# Patient Record
Sex: FEMALE | Race: WHITE | URBAN - METROPOLITAN AREA
[De-identification: names, ages, dates, MRNs, and addresses within clinical notes are randomized per-mention and may not be internally consistent; named-entity substitution may affect disease eponyms.]

---

## 2018-11-09 ENCOUNTER — TRANSFERRED RECORDS (OUTPATIENT)
Dept: HEALTH INFORMATION MANAGEMENT | Facility: CLINIC | Age: 43
End: 2018-11-09
Payer: OTHER MISCELLANEOUS

## 2019-03-13 RX ORDER — ZOLPIDEM TARTRATE 5 MG/1
5-10 TABLET ORAL
COMMUNITY

## 2019-03-13 RX ORDER — VENLAFAXINE HYDROCHLORIDE 150 MG/1
150 CAPSULE, EXTENDED RELEASE ORAL DAILY
COMMUNITY
End: 2020-11-18

## 2019-03-13 RX ORDER — LISINOPRIL AND HYDROCHLOROTHIAZIDE 20; 25 MG/1; MG/1
1 TABLET ORAL DAILY
COMMUNITY

## 2019-03-18 ASSESSMENT — MIFFLIN-ST. JEOR: SCORE: 1423.79

## 2019-03-19 ENCOUNTER — ANESTHESIA EVENT (OUTPATIENT)
Dept: SURGERY | Facility: AMBULATORY SURGERY CENTER | Age: 44
End: 2019-03-19

## 2019-03-19 NOTE — ANESTHESIA PREPROCEDURE EVALUATION
"Anesthesia Pre-Procedure Evaluation    Patient: Maira Kan   MRN:     [id number] Gender:   female   Age:    43 year old :      1975        Preoperative Diagnosis: Cosmetic   Procedure(s):  REMOVE IMPLANT BREAST  REPLACE SILICONE IMPLANTS, WISE PATTERN BREAST LIFT  LIPOSUCTION SUBAXILLARY     No past medical history on file.   No past surgical history on file.                PHYSICAL EXAM:   Mental Status/Neuro: A/A/O   Airway: Facies: Feasible  Mallampati: II  Mouth/Opening: Full  TM distance: > 6 cm  Neck ROM: Full   Respiratory: Auscultation: CTAB     Resp. Rate: Normal     Resp. Effort: Normal      CV: Rhythm: Regular  Rate: Age appropriate  Heart: Normal Sounds   Comments:      Dental: Normal                  No results found for: WBC, HGB, HCT, PLT, CRP, SED, NA, POTASSIUM, CHLORIDE, CO2, BUN, CR, GLC, ANNY, PHOS, MAG, ALBUMIN, PROTTOTAL, ALT, AST, GGT, ALKPHOS, BILITOTAL, BILIDIRECT, LIPASE, AMYLASE, ROSALINA, PTT, INR, FIBR, TSH, T4, T3, HCG, HCGS, CKTOTAL, CKMB, TROPN    Preop Vitals  BP Readings from Last 3 Encounters:   No data found for BP    Pulse Readings from Last 3 Encounters:   No data found for Pulse      Resp Readings from Last 3 Encounters:   No data found for Resp    SpO2 Readings from Last 3 Encounters:   No data found for SpO2      Temp Readings from Last 1 Encounters:   No data found for Temp    Ht Readings from Last 1 Encounters:   19 1.626 m (5' 4\")      Wt Readings from Last 1 Encounters:   19 79.4 kg (175 lb)    Estimated body mass index is 30.04 kg/m  as calculated from the following:    Height as of this encounter: 1.626 m (5' 4\").    Weight as of this encounter: 79.4 kg (175 lb).     LDA:            Assessment:   ASA SCORE: 2    NPO Status: > 2 hours since completed Clear Liquids; > 6 hours since completed Solid Foods   Documentation: H&P complete; Preop Testing complete; Consents complete   Proceeding: Proceed without further delay  Tobacco Use:  NO Active use of " Tobacco/UNKNOWN Tobacco use status     Plan:   Anes. Type:  General   Pre-Induction: Midazolam IV; Acetaminophen PO   Induction:  IV (Standard)   Airway: Oral ETT   Access/Monitoring: PIV   Maintenance: Balanced   Emergence: Procedure Site   Logistics: Same Day Surgery     Postop Pain/Sedation Strategy:  Standard-Options: Opioids PRN; IV Ketorolac; LA by Surgeon     PONV Management:  Adult Risk Factors: Female, Non-Smoker, Postop Opioids  Prevention: Ondansetron; Dexamethasone     CONSENT: Direct conversation   Plan and risks discussed with: Patient                        ANESTHESIA PREOP EVALUATION    PROCEDURE: Procedure(s):  REMOVE IMPLANT BREAST  REPLACE SILICONE IMPLANTS, WISE PATTERN BREAST LIFT  LIPOSUCTION SUBAXILLARY    HPI: Maira Kan is a 43 year old female who presents for above procedure 2/2 cosmetic.    PAST MEDICAL HISTORY:    No past medical history on file.    PAST SURGICAL HISTORY:    No past surgical history on file.    PAST ANESTHESIA HISTORY:     No personal or family h/o anesthesia problems    SOCIAL HISTORY:       Social History     Tobacco Use     Smoking status: Not on file   Substance Use Topics     Alcohol use: Not on file       ALLERGIES:     No Known Allergies    MEDICATIONS:       (Not in a hospital admission)    Current Outpatient Medications   Medication Sig Dispense Refill     buPROPion (WELLBUTRIN) 75 MG tablet Take 75 mg by mouth daily       lisinopril-hydrochlorothiazide (PRINZIDE/ZESTORETIC) 20-12.5 MG tablet Take 1 tablet by mouth daily       venlafaxine (EFFEXOR-XR) 150 MG 24 hr capsule Take 150 mg by mouth daily       zolpidem (AMBIEN) 5 MG tablet Take 5-10 mg by mouth nightly as needed for sleep         Current Outpatient Medications Ordered in Epic   Medication Sig Dispense Refill     buPROPion (WELLBUTRIN) 75 MG tablet Take 75 mg by mouth daily       lisinopril-hydrochlorothiazide (PRINZIDE/ZESTORETIC) 20-12.5 MG tablet Take 1 tablet by mouth daily       venlafaxine  (EFFEXOR-XR) 150 MG 24 hr capsule Take 150 mg by mouth daily       zolpidem (AMBIEN) 5 MG tablet Take 5-10 mg by mouth nightly as needed for sleep       No current Epic-ordered facility-administered medications on file.        PHYSICAL EXAM:    Vitals: T Data Unavailable, P Data Unavailable, BP Data Unavailable, R Data Unavailable, SpO2  , Weight Wt Readings from Last 2 Encounters:   03/18/19 79.4 kg (175 lb)     See doc flowsheet    NPO STATUS: see doc flowsheet    LABS:    BMP:  No results for input(s): NA, POTASSIUM, CHLORIDE, CO2, BUN, CR, GLC, ANNY in the last 19099 hours.    LFTs:   No results for input(s): PROTTOTAL, ALBUMIN, BILITOTAL, ALKPHOS, AST, ALT, BILIDIRECT in the last 35680 hours.    CBC:   No results for input(s): WBC, RBC, HGB, HCT, MCV, MCH, MCHC, RDW, PLT in the last 98814 hours.    Coags:  No results for input(s): INR, PTT, FIBR in the last 31644 hours.    Imaging:  No orders to display       Yuriy Clemons MD  Anesthesiology Staff  Pager [phone]    3/19/2019  1:24 PM        Yuriy Clemons MD

## 2019-03-20 ENCOUNTER — HOSPITAL ENCOUNTER (OUTPATIENT)
Facility: AMBULATORY SURGERY CENTER | Age: 44
Discharge: HOME | End: 2019-03-20
Attending: PLASTIC SURGERY | Admitting: PLASTIC SURGERY

## 2019-03-20 ENCOUNTER — SURGERY (OUTPATIENT)
Age: 44
End: 2019-03-20
Payer: OTHER MISCELLANEOUS

## 2019-03-20 ENCOUNTER — ANESTHESIA (OUTPATIENT)
Dept: SURGERY | Facility: AMBULATORY SURGERY CENTER | Age: 44
End: 2019-03-20
Payer: OTHER MISCELLANEOUS

## 2019-03-20 VITALS
HEIGHT: 64 IN | RESPIRATION RATE: 14 BRPM | WEIGHT: 175 LBS | BODY MASS INDEX: 29.88 KG/M2 | SYSTOLIC BLOOD PRESSURE: 100 MMHG | OXYGEN SATURATION: 97 % | DIASTOLIC BLOOD PRESSURE: 58 MMHG | HEART RATE: 111 BPM | TEMPERATURE: 98 F

## 2019-03-20 DIAGNOSIS — R52 PAIN: Primary | ICD-10-CM

## 2019-03-20 DIAGNOSIS — R11.0 NAUSEA AFTER ANESTHESIA, INITIAL ENCOUNTER: ICD-10-CM

## 2019-03-20 DIAGNOSIS — T88.59XA NAUSEA AFTER ANESTHESIA, INITIAL ENCOUNTER: ICD-10-CM

## 2019-03-20 DIAGNOSIS — B99.9 INFECTION: ICD-10-CM

## 2019-03-20 PROCEDURE — 88305 TISSUE EXAM BY PATHOLOGIST: CPT | Performed by: PLASTIC SURGERY

## 2019-03-20 PROCEDURE — 36000145

## 2019-03-20 PROCEDURE — 36000144 ZZH SURGERY LEVEL COSMETIC 240 MIN

## 2019-03-20 PROCEDURE — G8907 PT DOC NO EVENTS ON DISCHARG: HCPCS

## 2019-03-20 PROCEDURE — G8916 PT W IV AB GIVEN ON TIME: HCPCS

## 2019-03-20 PROCEDURE — L8600 IMPLANT BREAST SILICONE/EQ: HCPCS

## 2019-03-20 DEVICE — IMPLANTABLE DEVICE: Type: IMPLANTABLE DEVICE | Site: BREAST | Status: FUNCTIONAL

## 2019-03-20 RX ORDER — ONDANSETRON 4 MG/1
8 TABLET, ORALLY DISINTEGRATING ORAL EVERY 8 HOURS PRN
Qty: 4 TABLET | Refills: 0
Start: 2019-03-20 | End: 2020-11-18

## 2019-03-20 RX ORDER — LABETALOL HYDROCHLORIDE 5 MG/ML
INJECTION, SOLUTION INTRAVENOUS PRN
Status: DISCONTINUED | OUTPATIENT
Start: 2019-03-20 | End: 2019-03-20

## 2019-03-20 RX ORDER — CEFAZOLIN SODIUM 2 G/100ML
2 INJECTION, SOLUTION INTRAVENOUS
Status: COMPLETED | OUTPATIENT
Start: 2019-03-20 | End: 2019-03-20

## 2019-03-20 RX ORDER — SODIUM CHLORIDE, SODIUM LACTATE, POTASSIUM CHLORIDE, CALCIUM CHLORIDE 600; 310; 30; 20 MG/100ML; MG/100ML; MG/100ML; MG/100ML
INJECTION, SOLUTION INTRAVENOUS CONTINUOUS
Status: DISCONTINUED | OUTPATIENT
Start: 2019-03-20 | End: 2019-03-21 | Stop reason: HOSPADM

## 2019-03-20 RX ORDER — OXYCODONE AND ACETAMINOPHEN 5; 325 MG/1; MG/1
1-2 TABLET ORAL EVERY 4 HOURS PRN
Qty: 20 TABLET | Refills: 0
Start: 2019-03-20 | End: 2020-11-18

## 2019-03-20 RX ORDER — CEFAZOLIN SODIUM 1 G/3ML
1 INJECTION, POWDER, FOR SOLUTION INTRAMUSCULAR; INTRAVENOUS SEE ADMIN INSTRUCTIONS
Status: DISCONTINUED | OUTPATIENT
Start: 2019-03-20 | End: 2019-03-21 | Stop reason: HOSPADM

## 2019-03-20 RX ORDER — FENTANYL CITRATE 50 UG/ML
25-50 INJECTION, SOLUTION INTRAMUSCULAR; INTRAVENOUS
Status: DISCONTINUED | OUTPATIENT
Start: 2019-03-20 | End: 2019-03-21 | Stop reason: HOSPADM

## 2019-03-20 RX ORDER — MEPERIDINE HYDROCHLORIDE 25 MG/ML
12.5 INJECTION INTRAMUSCULAR; INTRAVENOUS; SUBCUTANEOUS
Status: DISCONTINUED | OUTPATIENT
Start: 2019-03-20 | End: 2019-03-21 | Stop reason: HOSPADM

## 2019-03-20 RX ORDER — GLYCOPYRROLATE 0.2 MG/ML
INJECTION, SOLUTION INTRAMUSCULAR; INTRAVENOUS PRN
Status: DISCONTINUED | OUTPATIENT
Start: 2019-03-20 | End: 2019-03-20

## 2019-03-20 RX ORDER — ONDANSETRON 2 MG/ML
4 INJECTION INTRAMUSCULAR; INTRAVENOUS EVERY 30 MIN PRN
Status: DISCONTINUED | OUTPATIENT
Start: 2019-03-20 | End: 2019-03-21 | Stop reason: HOSPADM

## 2019-03-20 RX ORDER — LIDOCAINE HYDROCHLORIDE 20 MG/ML
INJECTION, SOLUTION INFILTRATION; PERINEURAL PRN
Status: DISCONTINUED | OUTPATIENT
Start: 2019-03-20 | End: 2019-03-20

## 2019-03-20 RX ORDER — CEPHALEXIN 500 MG/1
500 CAPSULE ORAL 2 TIMES DAILY
Qty: 28 CAPSULE | Refills: 0
Start: 2019-03-20 | End: 2019-03-27

## 2019-03-20 RX ORDER — SCOLOPAMINE TRANSDERMAL SYSTEM 1 MG/1
1 PATCH, EXTENDED RELEASE TRANSDERMAL
Status: COMPLETED | OUTPATIENT
Start: 2019-03-20 | End: 2019-03-20

## 2019-03-20 RX ORDER — ONDANSETRON 4 MG/1
4 TABLET, ORALLY DISINTEGRATING ORAL
Status: DISCONTINUED | OUTPATIENT
Start: 2019-03-20 | End: 2019-03-21 | Stop reason: HOSPADM

## 2019-03-20 RX ORDER — ONDANSETRON 4 MG/1
4 TABLET, ORALLY DISINTEGRATING ORAL EVERY 30 MIN PRN
Status: DISCONTINUED | OUTPATIENT
Start: 2019-03-20 | End: 2019-03-21 | Stop reason: HOSPADM

## 2019-03-20 RX ORDER — OXYCODONE AND ACETAMINOPHEN 5; 325 MG/1; MG/1
2 TABLET ORAL
Status: DISCONTINUED | OUTPATIENT
Start: 2019-03-20 | End: 2019-03-21 | Stop reason: HOSPADM

## 2019-03-20 RX ORDER — ALBUTEROL SULFATE 0.83 MG/ML
2.5 SOLUTION RESPIRATORY (INHALATION) EVERY 4 HOURS PRN
Status: DISCONTINUED | OUTPATIENT
Start: 2019-03-20 | End: 2019-03-21 | Stop reason: HOSPADM

## 2019-03-20 RX ORDER — BUPIVACAINE HYDROCHLORIDE AND EPINEPHRINE 2.5; 5 MG/ML; UG/ML
INJECTION, SOLUTION INFILTRATION; PERINEURAL PRN
Status: DISCONTINUED | OUTPATIENT
Start: 2019-03-20 | End: 2019-03-20 | Stop reason: HOSPADM

## 2019-03-20 RX ORDER — DIAZEPAM 10 MG
10 TABLET ORAL EVERY 12 HOURS PRN
Status: DISCONTINUED
Start: 2019-03-20 | End: 2019-03-21 | Stop reason: HOSPADM

## 2019-03-20 RX ORDER — DEXAMETHASONE SODIUM PHOSPHATE 4 MG/ML
4 INJECTION, SOLUTION INTRA-ARTICULAR; INTRALESIONAL; INTRAMUSCULAR; INTRAVENOUS; SOFT TISSUE EVERY 10 MIN PRN
Status: DISCONTINUED | OUTPATIENT
Start: 2019-03-20 | End: 2019-03-21 | Stop reason: HOSPADM

## 2019-03-20 RX ORDER — LIDOCAINE 40 MG/G
CREAM TOPICAL
Status: DISCONTINUED | OUTPATIENT
Start: 2019-03-20 | End: 2019-03-21 | Stop reason: HOSPADM

## 2019-03-20 RX ORDER — NEOSTIGMINE METHYLSULFATE 1 MG/ML
VIAL (ML) INJECTION PRN
Status: DISCONTINUED | OUTPATIENT
Start: 2019-03-20 | End: 2019-03-20

## 2019-03-20 RX ORDER — HYDROXYZINE HYDROCHLORIDE 25 MG/1
25 TABLET, FILM COATED ORAL
Status: DISCONTINUED | OUTPATIENT
Start: 2019-03-20 | End: 2019-03-21 | Stop reason: HOSPADM

## 2019-03-20 RX ORDER — ACETAMINOPHEN 325 MG/1
975 TABLET ORAL ONCE
Status: COMPLETED | OUTPATIENT
Start: 2019-03-20 | End: 2019-03-20

## 2019-03-20 RX ORDER — ONDANSETRON 2 MG/ML
INJECTION INTRAMUSCULAR; INTRAVENOUS PRN
Status: DISCONTINUED | OUTPATIENT
Start: 2019-03-20 | End: 2019-03-20

## 2019-03-20 RX ORDER — PROPOFOL 10 MG/ML
INJECTION, EMULSION INTRAVENOUS PRN
Status: DISCONTINUED | OUTPATIENT
Start: 2019-03-20 | End: 2019-03-20

## 2019-03-20 RX ORDER — OXYCODONE HYDROCHLORIDE 5 MG/1
5-10 TABLET ORAL EVERY 4 HOURS PRN
Status: DISCONTINUED | OUTPATIENT
Start: 2019-03-20 | End: 2019-03-21 | Stop reason: HOSPADM

## 2019-03-20 RX ORDER — HYDROMORPHONE HYDROCHLORIDE 1 MG/ML
.3-.5 INJECTION, SOLUTION INTRAMUSCULAR; INTRAVENOUS; SUBCUTANEOUS EVERY 10 MIN PRN
Status: DISCONTINUED | OUTPATIENT
Start: 2019-03-20 | End: 2019-03-21 | Stop reason: HOSPADM

## 2019-03-20 RX ORDER — KETOROLAC TROMETHAMINE 30 MG/ML
30 INJECTION, SOLUTION INTRAMUSCULAR; INTRAVENOUS EVERY 6 HOURS PRN
Status: DISCONTINUED | OUTPATIENT
Start: 2019-03-20 | End: 2019-03-21 | Stop reason: HOSPADM

## 2019-03-20 RX ORDER — NALOXONE HYDROCHLORIDE 0.4 MG/ML
.1-.4 INJECTION, SOLUTION INTRAMUSCULAR; INTRAVENOUS; SUBCUTANEOUS
Status: DISCONTINUED | OUTPATIENT
Start: 2019-03-20 | End: 2019-03-21 | Stop reason: HOSPADM

## 2019-03-20 RX ORDER — GABAPENTIN 300 MG/1
300 CAPSULE ORAL ONCE
Status: COMPLETED | OUTPATIENT
Start: 2019-03-20 | End: 2019-03-20

## 2019-03-20 RX ORDER — DEXAMETHASONE SODIUM PHOSPHATE 4 MG/ML
10 INJECTION, SOLUTION INTRA-ARTICULAR; INTRALESIONAL; INTRAMUSCULAR; INTRAVENOUS; SOFT TISSUE ONCE
Status: COMPLETED | OUTPATIENT
Start: 2019-03-20 | End: 2019-03-20

## 2019-03-20 RX ORDER — FENTANYL CITRATE 50 UG/ML
INJECTION, SOLUTION INTRAMUSCULAR; INTRAVENOUS PRN
Status: DISCONTINUED | OUTPATIENT
Start: 2019-03-20 | End: 2019-03-20

## 2019-03-20 RX ORDER — HYDROXYZINE PAMOATE 25 MG/1
25 CAPSULE ORAL EVERY 6 HOURS PRN
Qty: 30 CAPSULE | Refills: 0
Start: 2019-03-20 | End: 2020-11-18

## 2019-03-20 RX ORDER — PROPOFOL 10 MG/ML
INJECTION, EMULSION INTRAVENOUS CONTINUOUS PRN
Status: DISCONTINUED | OUTPATIENT
Start: 2019-03-20 | End: 2019-03-20

## 2019-03-20 RX ORDER — TRIAMCINOLONE ACETONIDE 40 MG/ML
INJECTION, SUSPENSION INTRA-ARTICULAR; INTRAMUSCULAR PRN
Status: DISCONTINUED | OUTPATIENT
Start: 2019-03-20 | End: 2019-03-20 | Stop reason: HOSPADM

## 2019-03-20 RX ADMIN — TRIAMCINOLONE ACETONIDE 40 MG: 40 INJECTION, SUSPENSION INTRA-ARTICULAR; INTRAMUSCULAR at 11:35

## 2019-03-20 RX ADMIN — SODIUM CHLORIDE, SODIUM LACTATE, POTASSIUM CHLORIDE, CALCIUM CHLORIDE: 600; 310; 30; 20 INJECTION, SOLUTION INTRAVENOUS at 09:54

## 2019-03-20 RX ADMIN — ACETAMINOPHEN 975 MG: 325 TABLET ORAL at 06:48

## 2019-03-20 RX ADMIN — GLYCOPYRROLATE 0.3 MG: 0.2 INJECTION, SOLUTION INTRAMUSCULAR; INTRAVENOUS at 12:02

## 2019-03-20 RX ADMIN — ONDANSETRON 4 MG: 2 INJECTION INTRAMUSCULAR; INTRAVENOUS at 11:53

## 2019-03-20 RX ADMIN — DEXAMETHASONE SODIUM PHOSPHATE 10 MG: 4 INJECTION, SOLUTION INTRA-ARTICULAR; INTRALESIONAL; INTRAMUSCULAR; INTRAVENOUS; SOFT TISSUE at 08:25

## 2019-03-20 RX ADMIN — Medication 20 MG: at 08:20

## 2019-03-20 RX ADMIN — SODIUM CHLORIDE, SODIUM LACTATE, POTASSIUM CHLORIDE, CALCIUM CHLORIDE: 600; 310; 30; 20 INJECTION, SOLUTION INTRAVENOUS at 08:16

## 2019-03-20 RX ADMIN — FENTANYL CITRATE 50 MCG: 50 INJECTION, SOLUTION INTRAMUSCULAR; INTRAVENOUS at 12:58

## 2019-03-20 RX ADMIN — PROPOFOL 200 MG: 10 INJECTION, EMULSION INTRAVENOUS at 08:20

## 2019-03-20 RX ADMIN — FENTANYL CITRATE 100 MCG: 50 INJECTION, SOLUTION INTRAMUSCULAR; INTRAVENOUS at 08:20

## 2019-03-20 RX ADMIN — Medication 20 MG: at 08:40

## 2019-03-20 RX ADMIN — Medication 20 MG: at 10:09

## 2019-03-20 RX ADMIN — GABAPENTIN 300 MG: 300 CAPSULE ORAL at 06:48

## 2019-03-20 RX ADMIN — OXYCODONE HYDROCHLORIDE 10 MG: 5 TABLET ORAL at 12:41

## 2019-03-20 RX ADMIN — LIDOCAINE HYDROCHLORIDE 60 MG: 20 INJECTION, SOLUTION INFILTRATION; PERINEURAL at 08:20

## 2019-03-20 RX ADMIN — CEFAZOLIN SODIUM 1 G: 2 INJECTION, SOLUTION INTRAVENOUS at 10:28

## 2019-03-20 RX ADMIN — FENTANYL CITRATE 25 MCG: 50 INJECTION, SOLUTION INTRAMUSCULAR; INTRAVENOUS at 11:54

## 2019-03-20 RX ADMIN — Medication 20 MG: at 09:00

## 2019-03-20 RX ADMIN — FENTANYL CITRATE 25 MCG: 50 INJECTION, SOLUTION INTRAMUSCULAR; INTRAVENOUS at 10:17

## 2019-03-20 RX ADMIN — FENTANYL CITRATE 25 MCG: 50 INJECTION, SOLUTION INTRAMUSCULAR; INTRAVENOUS at 09:28

## 2019-03-20 RX ADMIN — SCOLOPAMINE TRANSDERMAL SYSTEM 1 PATCH: 1 PATCH, EXTENDED RELEASE TRANSDERMAL at 07:10

## 2019-03-20 RX ADMIN — CEFAZOLIN SODIUM 2 G: 2 INJECTION, SOLUTION INTRAVENOUS at 08:28

## 2019-03-20 RX ADMIN — Medication 10 MG: at 09:29

## 2019-03-20 RX ADMIN — HYDROMORPHONE HYDROCHLORIDE 0.5 MG: 1 INJECTION, SOLUTION INTRAMUSCULAR; INTRAVENOUS; SUBCUTANEOUS at 13:18

## 2019-03-20 RX ADMIN — FENTANYL CITRATE 50 MCG: 50 INJECTION, SOLUTION INTRAMUSCULAR; INTRAVENOUS at 12:50

## 2019-03-20 RX ADMIN — BUPIVACAINE HYDROCHLORIDE AND EPINEPHRINE 20 ML: 2.5; 5 INJECTION, SOLUTION INFILTRATION; PERINEURAL at 11:32

## 2019-03-20 RX ADMIN — Medication 3 MG: at 12:02

## 2019-03-20 RX ADMIN — SODIUM CHLORIDE, SODIUM LACTATE, POTASSIUM CHLORIDE, CALCIUM CHLORIDE: 600; 310; 30; 20 INJECTION, SOLUTION INTRAVENOUS at 07:06

## 2019-03-20 RX ADMIN — BUPIVACAINE HYDROCHLORIDE AND EPINEPHRINE 30 ML: 2.5; 5 INJECTION, SOLUTION INFILTRATION; PERINEURAL at 12:24

## 2019-03-20 RX ADMIN — LABETALOL HYDROCHLORIDE 10 MG: 5 INJECTION, SOLUTION INTRAVENOUS at 08:28

## 2019-03-20 RX ADMIN — SODIUM CHLORIDE, SODIUM LACTATE, POTASSIUM CHLORIDE, CALCIUM CHLORIDE: 600; 310; 30; 20 INJECTION, SOLUTION INTRAVENOUS at 11:22

## 2019-03-20 RX ADMIN — HYDROMORPHONE HYDROCHLORIDE 0.5 MG: 1 INJECTION, SOLUTION INTRAMUSCULAR; INTRAVENOUS; SUBCUTANEOUS at 13:34

## 2019-03-20 RX ADMIN — PROPOFOL 175 MCG/KG/MIN: 10 INJECTION, EMULSION INTRAVENOUS at 08:20

## 2019-03-20 NOTE — ANESTHESIA CARE TRANSFER NOTE
Patient: Maira Kan    Procedure(s):  BILATERAL REMOVE IMPLANT BREAST  BILATERAL REPLACE SILICONE IMPLANTS, WISE PATTERN BREAST LIFT  BILATERAL LIPOSUCTION SUBAXILLARY    Diagnosis: Cosmetic  Diagnosis Additional Information: No value filed.    Anesthesia Type:   General     Note:  Airway :Nasal Cannula  Patient transferred to:PACU  Comments: Awake, comfortable, sats 99%, Report to RN.Handoff Report: Identifed the Patient, Identified the Reponsible Provider, Reviewed the pertinent medical history, Discussed the surgical course, Reviewed Intra-OP anesthesia mangement and issues during anesthesia, Set expectations for post-procedure period and Allowed opportunity for questions and acknowledgement of understanding      Vitals: (Last set prior to Anesthesia Care Transfer)    CRNA VITALS  3/20/2019 1159 - 3/20/2019 1236      3/20/2019             Resp Rate (observed):  7  (Abnormal)                 Electronically Signed By: AGUSTIN Chery CRNA  March 20, 2019  12:36 PM

## 2019-03-20 NOTE — BRIEF OP NOTE
[facility] Brief Operative Note    Pre-operative diagnosis: Ruptured right breast implant; breast ptosis; localized fat accumulation of axilla   Post-operative diagnosis same   Procedure: Procedure(s):  BILATERAL REMOVE IMPLANT BREAST  BILATERAL REPLACE SILICONE IMPLANTS, WISE PATTERN BREAST LIFT  BILATERAL LIPOSUCTION SUBAXILLARY   Surgeon(s): Surgeon(s) and Role:     * Damion Arce MD - Primary   Estimated blood loss: minimal   Specimens: ID Type Source Tests Collected by Time Destination   A : Left Breast Tissue Tissue Breast, Left SURGICAL PATHOLOGY EXAM Damion Arce MD 3/20/2019  9:53 AM    B : Right breast tissue Tissue Breast, Right SURGICAL PATHOLOGY EXAM Damion Arce MD 3/20/2019 11:38 AM       Findings:  Assist: Sandy Mendoza  Complications: none  Condition: extubated and stable to PAR    Damion Arce MD Right breast implant ruptured, left intact

## 2019-03-20 NOTE — ANESTHESIA POSTPROCEDURE EVALUATION
Anesthesia POST Procedure Evaluation    Patient: Maira Kan   MRN:     [id number] Gender:   female   Age:    43 year old :      1975        Preoperative Diagnosis: Cosmetic   Procedure(s):  BILATERAL REMOVE IMPLANT BREAST  BILATERAL REPLACE SILICONE IMPLANTS, WISE PATTERN BREAST LIFT  BILATERAL LIPOSUCTION SUBAXILLARY   Postop Comments: No value filed.       Anesthesia Type:  General    Reportable Event: NO     PAIN: Uncomplicated   Sign Out status: Comfortable, Well controlled pain     PONV: No PONV   Sign Out status:  No Nausea or Vomiting     Neuro/Psych: Uneventful perioperative course   Sign Out Status: Preoperative baseline; Age appropriate mentation     Airway/Resp.: Uneventful perioperative course   Sign Out Status: Non labored breathing, age appropriate RR; Resp. Status within EXPECTED Parameters     CV: Uneventful perioperative course   Sign Out status: Appropriate BP and perfusion indices; Appropriate HR/Rhythm     Disposition:   Sign Out in:  PACU  Disposition:  Phase II; Home  Recovery Course: Uneventful  Follow-Up: Not required           Last Anesthesia Record Vitals:  CRNA VITALS  3/20/2019 1159 - 3/20/2019 1259      3/20/2019             Resp Rate (observed):  7  (Abnormal)           Last PACU/Preop Vitals:  Vitals:    19 1340 19 1345 19 1350   BP: 114/49 99/64 100/58   Pulse: 113 106 111   Resp: 16 15 14   Temp:      SpO2: 92% 92% 97%         Electronically Signed By: Yuriy Clemons MD, 2019

## 2019-03-20 NOTE — OR NURSING
Care taken over at this time, report received from Catina DEL RIO. Pt getting dressed and ready to go home.

## 2019-03-20 NOTE — DISCHARGE INSTRUCTIONS
[facility]  Same-Day Surgery   Adult Discharge Orders & Instructions   For 24 hours after surgery  1. Get plenty of rest.  A responsible adult must stay with you for at least 24 hours after you leave the hospital.   2. Do not drive or use heavy equipment.  If you have weakness or tingling, don't drive or use heavy equipment until this feeling goes away.  3. Do not drink alcohol.  4. Avoid strenuous or risky activities.  Ask for help when climbing stairs.   5. You may feel lightheaded.  IF so, sit for a few minutes before standing.  Have someone help you get up.   6. If you have nausea (feel sick to your stomach): Drink only clear liquids such as apple juice, ginger ale, broth or 7-Up.  Rest may also help.  Be sure to drink enough fluids.  Move to a regular diet as you feel able.  7. You may have a slight fever. Call the doctor if your fever is over 100 F (37.7 C) (taken under the tongue) or lasts longer than 24 hours.  8. You may have a dry mouth, a sore throat, muscle aches or trouble sleeping.  These should go away after 24 hours.  9. Do not make important or legal decisions.   Call your doctor for any of the followin.  Signs of infection (fever, growing tenderness at the surgery site, a large amount of drainage or bleeding, severe pain, foul-smelling drainage, redness, swelling).    2. It has been over 8 to 10 hours since surgery and you are still not able to urinate (pass water).    3.  Headache for over 24 hours.    To contact Dr Garcia call:    [phone] - Day  [phone] - After hours pager    Tylenol was given at 6:50 AM    SCOPALAMINE Patch placed behind  RIGHT ear for nausea relief.  Remove the patch in 24-26 hours.  Dispose in trash and wash hands carefully.     Information for Patients Discharging with a Transderm Scopolamine Patch       Dry mouth is a common side effect.    Drowsiness is another common side effect especially when combined with pain medication.   Please avoid activities that require mental alertness such as driving a car or making important legal decisions.    Since Scopolamine can cause temporary dilation of the pupils and blurred vision if it comes in contact with the eyes; be sure to wash your hands thoroughly with soap and water immediately after handling the patch.   When you remove your patch, please stick it to a tissue or paper towel for disposal.      Remove the patch immediately and contact a physician in the unlikely event that you experience symptoms of acute glaucoma (pain and reddening of the eyes, accompanied by dilated pupils).  Remove the patch if you develop any difficulties urinating.  If you cannot urinate after removing your patch, please notify your surgeon.      ankle pump exercises: This particular exercise is important because it helps decrease the swelling in the knee and lower leg.  It s also very important in helping you avoid developing blood clots in your lower leg(s) after surgery.   To do an ankle pump you point and flex your foot back and forth.  You should do 10 repetitions several times during the day. You really can t over do these.    Deep breathing and coughing:  It's important to learn deep breathing and coughing exercises as these will help to lower your risk of lung complications after your surgery.  Breathing deeply:  Moves air down to the bottom areas of the lungs   Opens air passages and moves mucous out (coughing is also easier)   Helps the blood and oxygen supply to your lungs, boosting circulation   Lowers the risk of lung complications such as pneumonia and infections  Breathe in deeply and slowly through your nose, expanding your lower rib cage, and letting your abdomen move forward. Hold for a count of 3 to 5. Breathe out slowly and completely.  Don't force your breath out. On the third breath, cough deeply from the lungs, not the throat.  Rest and repeat every hour while you are awake.      Managing Your  Pain   Pain management is an important part of your care. When you are in pain or uncomfortable, it can affect the way you feel both physically and emotionally.   The longer pain goes untreated, the harder it is to relieve. Effective pain management can break the pain cycle.   When you take care of your pain before it becomes a problem you will:     Heal faster     Be more comfortable when walking and doing breathing exercises     Regain your strength faster     Other Ways to Manage Pain   There are many ways besides medication to treat your pain. Ask your nurse or doctor for more information about:     Relaxation techniques     Guided imagery     Breathing exercises     Hot or cold packs     Massage     Changing position (elevation or support)     Using pillows or splints to protect incisions when coughing, laughing, etc.     Music     The goal is for you to be able to complete activities such as turning in bed, walking and doing deep breathing exercises with only mild to moderate pain.   Possible Side Effects of Pain Medications     Constipation     Sleepiness     Dry mouth     Nausea and/or vomiting   It is important for you to let your nurse or doctor know if you have any of these side effects.     What You Can Do to Help with the Side Effects     Drink as much fluid as possible     Eat foods high in fiber (beans, lentils, fruits)     Ask for medication if you continue to have problems with constipation     Suck on sugarless hard candy, or ice     Take pain medications with food     Peppermint can be helpful to decrease nausea     Managing Your Pain at Home   Your doctor may give you a prescription for pain medicine to take at home. Most pain medications to be taken at home are in pill form.   Your nurse will review the instructions for taking your pain medications. When taken by mouth, medication can take up to 30 minutes to be effective. Remember to take pain medication when your pain first  begins      Remember, same day surgery does not mean same day recovery.  Healing is a gradual process.  It is normal to be impatient and feel discouraged while waiting for swelling, bruising, discomfort and numbness to diminish.  Allow yourself to be a patient!  Extra rest, a nutritious diet, and avoidance of stress are important aids to recovery.

## 2019-03-25 LAB — COPATH REPORT: NORMAL

## 2019-04-02 NOTE — OP NOTE
Procedure Date: 03/20/2019      LOCATION:  [facility] [place].      SURGEON:  Damion Arce MD.      PREOPERATIVE DIAGNOSES:   1.  Ruptured right Labelle silicone gel breast implant.   2.  Grade II to III breast ptosis.     3.  Localized fat accumulation of subaxillary area and chest below the inframammary fold.      POSTOPERATIVE DIAGNOSES:   1.  Ruptured right Labelle silicone gel breast implant.   2.  Grade II to III breast ptosis.     3.  Localized fat accumulation of subaxillary area and chest below the inframammary fold.      OPERATION:   1.  Remove and replace silicone breast implants.  The right breast implant was found to be ruptured.  The left breast implant was found to be intact.  Serial numbers of breast implants: Right breast implant, Labelle 400 mL implant, lot #[id number].  Left breast implant was found to be ruptured and is a Labelle 400 mL implant, lot number [id number].  Replacement implants:  Left breast Labelle smooth round ultra-high profile Labelle MemoryGel style 5000, 480 mL smooth round ultra-high profile breast implant, reference number [id number], lot number is [id number], serial number is [id number].  Right breast implant, Labelle style 5000, 480 mL smooth round ultra-high profile Labelle MemoryGel breast implant, reference number [id number] [phone], lot number [id number], serial number [id number].   2.  Modified Wise pattern breast lift.   3.  Suction lipectomy of bilateral subaxillary areas and left chest below the inframammary fold.        Wetting solution utilized 800 mL mixed as follows:  In each liter of lactated Ringer's was placed 10 mL of 1% Xylocaine plain and 1.5 mL of 1:1000 epinephrine.  Total suction lipectomy volume 625 mL.      ANESTHETIC:  General endotracheal.      INDICATIONS:  The patient is a very pleasant and attractive 43-year-old female who underwent a Benelli augmentation mastopexy in approximately 2004 or 2005 while the breast implants were still in preclinical  trials as part of studies through the [employer] breast implant company.  The patient underwent a screening mammogram which suggested a ruptured right breast implant was confirmed by MRI scan.  The patient is now brought to the operating room for removal and replacement of her bilateral implants including her ruptured Right breast implant and what is seen to be an intact left breast implant by MRI scan.  The patient has developed ptosis over the last 14 years and she will be undergoing a Wise pattern breast lift as part of this operation.  She wishes her breasts to be more projecting and narrower so will be using an ultra-high profile implant.  I will also be performing liposuction in the subaxillary area and in her inner upper chest below her inframammary fold.  The patient was told that breast implants are not a lifetime medical device and she can expect her breast implants to be replaced within her lifetime.  The patient understands there is a phenomenon known as silent rupture, the implant may fail and neither the patient nor surgeon are aware of this.  She understands the MRI scan is the best means of detecting silent rupture.  The patient was told the leading cause for reoperation or bleeding sequelae of breast augmentation is known as capsular contracture.  She was told that capsular contracture is scarring process around the breast implant.  Maneuvers we can utilize to diminish chance of capsular contracture include submuscular breast augmentation.  The current breast implants are in a submuscular position.  Other maneuvers we can utilize to diminish her chance of capsule contracture include placement of the breast implants through an inframammary fold.  The inframammary fold placement has been shown to confer a nearly 10-fold reduction in the rate of capsular contracture compared with a periareolar breast augmentation.  The rate of capsular contracture through a periareolar incision is approximately 9.5%,  whereas the rate of capsular contracture through an inframammary fold incision is 0.59% in clinical studies performed by Terrance oHgan MD from [facility].  Use of breast pocket irrigation consisting of Ancef, gentamicin and bacitracin has been shown to decrease the rate of capsular contracture in clinical studies performed by Tre Potter MD from the [facility].  I used breast implant pocket irrigations at her initial surgery 14 years ago and I will be using them again at today's operation.  For the last 7 years I have been using a Alonso funnel for placement of the breast implants which allows for a no-touch technique when placing the implants.  On the side that is ruptured I will be cleansing the pocket with Techni-Care to remove all traces of silicone gel.  The patient was told the other potential complications of the operation include a chance of bleeding, infection, hematoma, seroma, asymmetry, recurrent ptosis, hypertrophic scarring, and possible dissatisfaction with cosmetic outcome.  In addition to removing and replacement of breast implants, the patient wishes to have her breast implants in a higher position on her breast mound and have a narrower base diameter.  A narrow base diameter will be provided by using implants which are ultra-high profile in terms of the breast implant style.  This time of implant has the narrowest base diameter and the greatest projection.  In addition to this, we will be liposuctioning some fat accumulation which has occurred in the lateral aspect of her breast in the subaxillary area as well as over her rib cage in the inframammary fold area.  The patient has been given a chance to ask questions and all were answered.  The patient provides her informed consent for the procedure.      PROCEDURE AND FINDINGS:  In the preoperative holding area, her breasts were marked in terms of the Wise pattern, left breast lift and the location of the  inframammary fold.  The areas to be liposuctioned in the subaxillary area and over her rib cage and below the inframammary fold were also marked using a [place] permanent marking pen.  The patient was taken to the operating room, placed in supine position on the operating room table.  A successful general endotracheal anesthetic was then induced.  The patient received 2 grams of Ancef and 10 mg of Decadron intravenously prior to commencing with surgery.  The chest was prepped and draped in routine sterile fashion.  After prepping and draping was complete, Tegaderm dressings were placed over her nipple areolar complexes to keep them completely out of the operative field.  Starting on the left side where the implant was known to be intact, incision was made in the inframammary fold.  This incision was carried down to the capsule.  The capsule was entered with a tenotomy scissors and it was seen that the implant was indeed completely intact.  The pocket was then plicated to narrow the pocket so that it matched the narrower base diameter of the ultra-high profile implant.  A series of 2-0 PDS sutures were placed in curvilinear fashion on the lateral aspect of the breast to give a shape to the pocket which conformed to the size of her ultra-high profile implant.  The sizer was placed and a very nice shape to it being achieved.  At this point, we irrigated the pocket with a solution consisting of Ancef, gentamicin and bacitracin.  The [place] smooth round ultra-high profile 480 mL style 5000 ultra-high profile implant was opened on the back table.  All operative personnel changed their gloves.  An INPLANT funnel was opened.  The provided lubricant was dispersed.  The Ancef, gentamicin, bacitracin solution was then placed in the INPLANT funnel.  The INPLANT funnel was opened and the implant was transferred from its sterile container into the INPLANT funnel.  The stapler was closed and the INPLANT funnel was trimmed to the  appropriate size.  Techni-Care was also added to the INPLANT funnel.  The implant was inserted using utilizing a no-touch technique.  Closure consisted of 3-0 Vicryl sutures in the muscular and capsular plane.  Deep subcutaneous sutures were then placed using 4-0 PDS suture in buried interrupted fashion.  Deep dermal sutures were then placed using 4-0 Monocryl suture in buried interrupted deep dermal fashion.  Inframammary fold incision was run using 4-0 Prolene suture in running and buried continuous intracuticular fashion.      Attention was then directed to the left side where I was able to come virtually completely around the implant except at its superior pole.  Decision was made not to try to come around the superior aspect of the breast over concern that we could become too thin in this area.  I was able to come completely under the breast implant capsule, keeping it completely intact.  I then entered the breast capsule using a tenotomy scissors with laparotomy sponges in place.  The ruptured gel was removed and kept completely within a laparotomy sponge.  By means of Kocher clamps, I then grasped the capsule and implant shell and it was removed completely intact and sent back to [facility] along with the intact left breast implant for analysis and to comply with their warranty.  The left breast pocket was then cleansed using sponge sticks soaked in Techni-Care.  I then flushed the pocket copiously with copious saline containing Techni-Care.  This rinse was continued until all remnants of Techni-Care and any silicone had been removed.  I then placed a series of plicating sutures in the lateral aspect of the breast to narrow the pocket to accommodate a narrower style 5000, 480 mL smooth round ultra-high profile implant.  These sutures were placed using 2-0 PDS suture in curvilinear and horizontal mattressing fashion.  A sizer was placed and this gave very nice shape to her breast.  At this point, all operative  personnel changed their gloves.  The 480 mL implant was opened on the back table.  The INPLANT funnel was lubricated using supplied lubricant.  To this I added Techni-Care and Ancef, gentamicin and bacitracin.  The implant was transferred from its sterile container into the INPLANT funnel and the zipper was closed.  The implant was inserted using a no-touch technique.  Closure was identical from left to right.  I did perform the Wise pattern mastopexy.  The vertical and inframammary fold incisions were closed using 3-0 PDS sutures in deep dermal fashion.  4-0 PDS sutures were placed between my 3-0 PDS sutures in buried and interrupted deep dermal fashion.  The vertical incision and inframammary fold incisions were closed using 4-0 Monocryl suture in buried and running intracuticular fashion. At this point, incisions were made within her anterior axillary line and lateral to my incision.  Through these incisions was infused 800 mL of wetting solution of 400 mL per side.  I then performed liposuction of her subaxillary area and inframammary fold area using a combination of 3 and 4 mm curved [place] liposuction cannulas.  A very even suction lipectomy was performed on the patient's right and left sides.  Liposuction access incisions were closed using 5-0 Monocryl suture in buried and interrupted intracuticular fashion and 5-0 Prolene sutures in a simple interrupted fashion.  Dressing consisted of Mastisol and half-inch Steri-Strips at the vertical incision and inframammary fold incisions as well as over my liposuction access incisions.  Epifoam was cut to fit around her breast in the lateral aspect and below the inframammary fold.  The patient was placed in a surgical bra on the operating room table.      ESTIMATED BLOOD LOSS:  Minimal.      COMPLICATIONS:  None.      CONDITION:  Extubated and stable to postanesthesia recovery.        SPECIMENS:  The specimens were sent to sterile processing and placed in containers to  be sent back to the [facility] in accordance with their warranty protocol.      OPERATIVE TIMES:  Total scheduled operating room time 5 hours and 30 minutes equals 5 hours of paid surgical operative time plus 30 minutes of anesthesia/room time.  Actual total operating room time 4 hours and 13 minutes equals 3 hours and 42 minutes of actual surgical operative time plus 31 minutes of actual anesthesia/room time.      OPERATING ROOM TIMES:  In room time 0816 a.m.  Timeout called 0842 a.m.  Procedure start 0843 a.m.  Procedure end 1224 p.m.  Out of room 1229 p.m.         MARISELA PALOMARES MD             D: 2019   T: 2019   MT: HERMAN      Name:     CHIQUITA CHAND   MRN:      [id number]        Account:        [id number]   :      1975           Procedure Date: 2019      Document: [id number]

## 2020-03-18 ENCOUNTER — TRANSFERRED RECORDS (OUTPATIENT)
Dept: HEALTH INFORMATION MANAGEMENT | Facility: CLINIC | Age: 45
End: 2020-03-18
Payer: OTHER MISCELLANEOUS

## 2020-11-16 ENCOUNTER — PRE VISIT (OUTPATIENT)
Dept: NEUROLOGY | Facility: CLINIC | Age: 45
End: 2020-11-16

## 2020-11-16 NOTE — TELEPHONE ENCOUNTER
FUTURE VISIT INFORMATION      FUTURE VISIT INFORMATION:    Date: 11/18/2020    Time: 1pm    Location: [place]  REFERRAL INFORMATION:    Referring provider:  Self     Referring providers clinic:      Reason for visit/diagnosis  Weakness and Numbness     RECORDS REQUESTED FROM:       Clinic name Comments Records Status Imaging Status   [facility] MR Lumbar Spine 12/19/2019 Care Everywhere Requested to PACS                                   11/16/2020-Spoke with [facility] Radiology to have MR Lumbar Spine pushed to [facility]-MR @ 545am    11/18/2020-[facility] images now in PACS-MR @ 527am

## 2020-11-18 ENCOUNTER — OFFICE VISIT (OUTPATIENT)
Dept: NEUROLOGY | Facility: CLINIC | Age: 45
End: 2020-11-18
Payer: COMMERCIAL

## 2020-11-18 VITALS
SYSTOLIC BLOOD PRESSURE: 125 MMHG | HEART RATE: 124 BPM | WEIGHT: 177 LBS | BODY MASS INDEX: 30.22 KG/M2 | RESPIRATION RATE: 16 BRPM | HEIGHT: 64 IN | DIASTOLIC BLOOD PRESSURE: 93 MMHG | OXYGEN SATURATION: 100 %

## 2020-11-18 DIAGNOSIS — R20.2 PARESTHESIAS: Primary | ICD-10-CM

## 2020-11-18 DIAGNOSIS — R20.2 PARESTHESIAS: ICD-10-CM

## 2020-11-18 LAB
FOLATE SERPL-MCNC: 19.9 NG/ML
TSH SERPL DL<=0.005 MIU/L-ACNC: 1.13 MU/L (ref 0.4–4)
VIT B12 SERPL-MCNC: 315 PG/ML (ref 193–986)

## 2020-11-18 PROCEDURE — 36415 COLL VENOUS BLD VENIPUNCTURE: CPT | Performed by: PATHOLOGY

## 2020-11-18 PROCEDURE — 84443 ASSAY THYROID STIM HORMONE: CPT | Performed by: PATHOLOGY

## 2020-11-18 PROCEDURE — 82607 VITAMIN B-12: CPT | Performed by: PATHOLOGY

## 2020-11-18 PROCEDURE — 99204 OFFICE O/P NEW MOD 45 MIN: CPT | Mod: GC | Performed by: STUDENT IN AN ORGANIZED HEALTH CARE EDUCATION/TRAINING PROGRAM

## 2020-11-18 PROCEDURE — 82746 ASSAY OF FOLIC ACID SERUM: CPT | Mod: 90 | Performed by: PATHOLOGY

## 2020-11-18 PROCEDURE — 99000 SPECIMEN HANDLING OFFICE-LAB: CPT | Performed by: PATHOLOGY

## 2020-11-18 PROCEDURE — 83921 ORGANIC ACID SINGLE QUANT: CPT | Mod: 90 | Performed by: PATHOLOGY

## 2020-11-18 RX ORDER — PHENOL 1.4 %
10-20 AEROSOL, SPRAY (ML) MUCOUS MEMBRANE
COMMUNITY

## 2020-11-18 RX ORDER — ACETAMINOPHEN 325 MG/1
325-650 TABLET ORAL
COMMUNITY

## 2020-11-18 RX ORDER — MULTIVIT-MIN/IRON/FOLIC ACID/K 18-600-40
CAPSULE ORAL DAILY
COMMUNITY

## 2020-11-18 RX ORDER — ESCITALOPRAM OXALATE 20 MG/1
20 TABLET ORAL
COMMUNITY
Start: 2020-08-18 | End: 2022-06-29

## 2020-11-18 ASSESSMENT — MIFFLIN-ST. JEOR: SCORE: 1422.87

## 2020-11-18 ASSESSMENT — PAIN SCALES - GENERAL: PAINLEVEL: MILD PAIN (3)

## 2020-11-18 NOTE — PROGRESS NOTES
"  DEPARTMENT OF NEUROLOGY    Patient Name:  Maira Kan  MRN:  [id number]    :  1975  Date of Clinic Visit:  2020  Primary Care Provider:  No Ref-Primary, Physician      HPI: Ms. Kan is a 45-year-old female with a complex medical history including spinal stenosis and L5-S1 spondylolisthesis s/p bilateral decompression foraminotomy and L5-S1 interbody fusion (), HTN, psoriasis, depression and anxiety who presents to the Neurology Clinic for evaluation of chronic weakness and spasms as well as mind fog and other transient neurologic symptoms. Moreover, the patient and her  are hoping to identify a unifying diagnosis for her various puzzling medical issues over the last decade and a half.    The patient believes she first started having problems after the birth of her second-born child in . She recalls this being a very difficult pregnancy that was complicated by gestational diabetes and anemia. The delivery itself was largely uncomplicated, but she relates feeling as though she has never been able to \"bounce back\" from the pregnancy, noting that her depression and anxiety progressively worsened, she started to develop neurologic symptoms (e.g., brain fog), and had various other odd medical conditions that seemed atypical for a person of her age, such as plaque psoriasis and chronic strep throat requiring tonsillectomy and adenoidectomy.     The patient believes that the brain fog she endorses today started around the time of her second pregnancy in  or shortly thereafter. When describing the brain fog, she explains that whereas she used to be highly motivated to try and learn new things, she now becomes easily overwhelmed when presented with similar or even lesser challenges. 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 She initially stopped working about 4 " "years ago when she went back to school to get her bachelors, which she completed without any difficulty, but has not been able to return to work since. Prior to leaving her post, she explains that she would try to work nights as these tend to be less busy shifts with fewer things going on. However, at this point, she does not feel like she would be able to return to this level of work. She does recognize that the brain fog she experiences seems to be intimately tied to her depression and anxiety.    With respect to the weakness, she explains that she first started noticing that her lower body was feeling weaker prior to the spinal fusion she had in 2015. Prior to the surgery, she recalls feeling weak on the left side more so than on the right and was having a considerable amount of pain. She also had foot drop on the left side. Following the surgery, her pain had improved tremendously, but she does not feel as though she was ever able to fully regain her strength. Now, she feels like she has weakness all over her body and easily becomes both physically and mentally exhausted. Accompanying her weakness are sensations of numbness and tingling, which typically start at her hips and can extend all the way down to the level of her ankles. The numbness and tingling are usually precipitated by activity, such as walking or even just standing, and occur on a daily basis. These symptoms for the most part have been localized to her lower extremities but she did have an episode of numbness and tingling in her forearms this past summer that eventually self-resolved.     Whereas the numbness and tingling seem to occur at the onset of activity, she has found that her spasms seem to start at the tail end of activity or even some time after being active. She describes her muscle spasms feeling as if she had \"rubber bands\" wrapped around her legs at various heights. The spasms can be very severe and can last for several hours, " "sometimes even overnight.     The patient denies any changes in vision; no double vision or any sudden onset loss of vision. She also denies any episodes of sudden onset limb weakness or loss of bowel or bladder control. She does, however, have a family history of multiple sclerosis in her paternal aunt and that aunt's daughter.       ROS: 10-point review of systems was negative except for as noted above.     Vital signs:                         Estimated body mass index is 30.04 kg/m  as calculated from the following:    Height as of 3/18/19: 1.626 m (5' 4\").    Weight as of 3/18/19: 79.4 kg (175 lb).      Examination:     -General: sitting comfortably on the chair. No acute distress.    -HEENT: No skin discolorations noted on exposed skin. Head is normocephalic, atraumatic.     -Pulm: No increased work of breathing on RA.     -Abdomen: Soft, non-tender, non-distended.     -Musculoskeletal: No abnormalities noted.    -Neurological:     --MS: Patient is alert, attentive, and oriented. Speech is clear and fluent. Names normally. Remote memory intact. She recalls 2/3 objects after 5 minutes and is able to recall the third with a verbal clue.     --CNs: Pupils symmetric, round and reactive to light. Visual fields are full. Ocular motility is full without nystagmus. Facial sensation intact. Muscles of mastication and facial expression normal. Hearing intact to conversation. Palate elevation normal and uvula midline. Sternomastoid and trapezius function normal. Tongue motions normal.     --Motor: Normal muscle tone and bulk.  strength is strong bilaterally. Strength is 5/5 in bilateral upper extremities at the biceps and triceps. In her lower extremities, she has 5/5 strength with hip flexion and extension b/l, 5/5 strength with knee flexion and extension b/l. Dorsiflexion and plantarflexion 4/5 on R, 3/5 on the L.     --Reflexes: Hyperreflexic biceps and brachioradialis on the R compared to the L but negative " Arce. Reflexes at the patellae are very brisk with component of increased participation. Toes are mute.     --Sensory: There is reduced sensation to pinprick and vibration in the LLE as compared to the right. Proprioception in b/l LE preserved. Normal and symmetric sensation to PP and vibration in b/l upper extremities.     --Coordination: Heel-shin and finger-nose-finger is intact. Positive Romberg.     --Gait: Stands with feet normally spaced. Gait is somewhat slowed and cautious.      INVESTIGATIONS:  All available and relevant labs, imaging, and other procedures were reviewed with the patient at this visit.       IMPRESSION/RECOMMENDATIONS:   Ms. Kan is a 45-year-old female with a complex history most significant from a neurological standpoint for spinal stenosis and L5-S1 spondylolisthesis s/p bilateral decompression foraminotomy and L5-S1 interbody fusion (2015), as well as depression and anxiety. She presents today with the goal of ruling out any serious neurological etiologies for her symptoms of brain fog, weakness, muscle spasms and paresthesias. Namely, she is most interested in ruling out stroke and multiple sclerosis as potential causes for her symptoms. As we discussed, the history she describes today and the constellation of symptoms she has been dealing with are very uncharacteristic of any stroke syndrome I am aware of, and my suspicion for a vascular etiology is very low. Her presentation would also be very atypical for multiple sclerosis given the duration of her symptoms, the presence of triggers such as activity and stress, and the absence of affected vision. However, given that she has been dealing with these symptoms for many years now and does have abnormal exam findings (namely, hyperreflexia in the RUE), I do think it is reasonable to pursue imaging at this time to rule out any central pathology. To this end, we will get MRIs of her brain and C-spine with and without contrast, which  "will help us rule out several potential etiologies, including demyelinating disorders such as MS. In addition, I would like to pursue a neuropathy work-up at this time; we will check a vitamin B12, SPEP, TSH, MMA and folate. Pt will be contacted with her results via [place] if normal/negative and we will call her with any abnormal/positive findings. We will also plan for her to follow-up with a virtual visit in 6 weeks to discuss her results at greater length. In the meantime, she will call with any questions or concerns.      Patient has been seen with Dr. Brown who agrees with my assessment and plan.    Benjamin Romo MD  [facility] Department of Neurology PGY1  Pager: [phone]    Attending attestation: I saw and evaluated the patient on 11/18/20 with resident Dr. Romo. Please see his note dated 11/18/2020 for further details.     Mrs. Kan presents with her  today concerned about 15 years of feeling generally unwell. Specifically, she describes \"brain fog,\" global weakness and migrating paresthesias that all started in the context of worsening depression after a difficult pregnancy in 2004. She denies any overt relapses or acute onset of neurologic symptoms and particularly has never had symptoms of optic neuritis, unexplained bowel/bladder symptoms, or focal weakness. However, she did have sciatic pain and focal L foot drop in 2014 that greatly improved with decompressive foraminectomy at L4-5. Instrumentation was removed this summer and she again had improvement of her weakness and pain in that leg. Brain frog is nonspecific but worsens with mood and is certainly more difficult in high stimulus environments. 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 She also has numbness that intermittent and involves bilateral legs, exacerbated with standing and fatigue, and often followed by spasms that can last for hours and persist in sleep. "     Neurologic exam is notable for absence of APD, full EOM, normal fundoscopic exam. Tone and strength are normal throughout with the exception of 4/5 L ankle dorsiflexion. She has asymmetrically brisk R biceps and BR compared to L without a Arce's. Bilateral patellars are brisk but there is no cross-adduction, ankles are 2/4, toes are down, no clonus. She has reduced pinprick over the left arm and leg. Remainder of sensory exam, cerebellar testing, Rhomberg is normal. Gait is cautious and slightly wide based.    Recent L spine MRI reviewed, and report from brain MRI 2012 reviewed - normal.     Overall, Mrs. Kan has had over a decade of fatigue, nonspecific cognitive impairment, paresthesias and sensation of weakness. My suspicion for a unifying neurologic diagnosis is low. However, she does have asymmetrically brisk RUE reflexes in the absence of pathologic reflexes, and reduced pinprick over the left side. For this reason, brain and C spine MRIs are warranted. I will also send neuropathy labs. If these tests are normal, we discussed that her symptoms may be secondary to her poor mood and depression, which she understands. We will discuss this again after the above work up is complete. I also offered to refer her to a psychiatrist within our system and made some recommendations in the community as well. All of their questions are answered.    Lyubov Brown DO   of Neurology

## 2020-11-18 NOTE — NURSING NOTE
Chief Complaint   Patient presents with     Consult     [facility] - ongoing weakness, numbness, tingling and spasms in both legs.     Jose M Gallo

## 2020-11-18 NOTE — LETTER
"2020       RE: Maira Kan  [address] [id number]     Dear Colleague,    Thank you for referring your patient, Maira Kan, to the [facility] [place] [facility]. Please see a copy of my visit note below.      DEPARTMENT OF NEUROLOGY    Patient Name:  Maira Kan  MRN:  [id number]    :  1975  Date of Clinic Visit:  2020  Primary Care Provider:  No Ref-Primary, Physician      HPI: Ms. Kan is a 45-year-old female with a complex medical history including spinal stenosis and L5-S1 spondylolisthesis s/p bilateral decompression foraminotomy and L5-S1 interbody fusion (), HTN, psoriasis, depression and anxiety who presents to the Neurology Clinic for evaluation of chronic weakness and spasms as well as mind fog and other transient neurologic symptoms. Moreover, the patient and her  are hoping to identify a unifying diagnosis for her various puzzling medical issues over the last decade and a half.    The patient believes she first started having problems after the birth of her second-born child in . She recalls this being a very difficult pregnancy that was complicated by gestational diabetes and anemia. The delivery itself was largely uncomplicated, but she relates feeling as though she has never been able to \"bounce back\" from the pregnancy, noting that her depression and anxiety progressively worsened, she started to develop neurologic symptoms (e.g., brain fog), and had various other odd medical conditions that seemed atypical for a person of her age, such as plaque psoriasis and chronic strep throat requiring tonsillectomy and adenoidectomy.     The patient believes that the brain fog she endorses today started around the time of her second pregnancy in  or shortly thereafter. When describing the brain fog, she explains that whereas she used to be highly motivated to " try and learn new things, she now becomes easily overwhelmed when presented with similar or even lesser challenges. 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 She initially stopped working about 4 years ago when she went back to school to get her bachelors, which she completed without any difficulty, but has not been able to return to work since. Prior to leaving her post, she explains that she would try to work nights as these tend to be less busy shifts with fewer things going on. However, at this point, she does not feel like she would be able to return to this level of work. She does recognize that the brain fog she experiences seems to be intimately tied to her depression and anxiety.    With respect to the weakness, she explains that she first started noticing that her lower body was feeling weaker prior to the spinal fusion she had in 2015. Prior to the surgery, she recalls feeling weak on the left side more so than on the right and was having a considerable amount of pain. She also had foot drop on the left side. Following the surgery, her pain had improved tremendously, but she does not feel as though she was ever able to fully regain her strength. Now, she feels like she has weakness all over her body and easily becomes both physically and mentally exhausted. Accompanying her weakness are sensations of numbness and tingling, which typically start at her hips and can extend all the way down to the level of her ankles. The numbness and tingling are usually precipitated by activity, such as walking or even just standing, and occur on a daily basis. These symptoms for the most part have been localized to her lower extremities but she did have an episode of numbness and tingling in her forearms this past summer that eventually self-resolved.     Whereas the numbness and tingling seem to occur  "at the onset of activity, she has found that her spasms seem to start at the tail end of activity or even some time after being active. She describes her muscle spasms feeling as if she had \"rubber bands\" wrapped around her legs at various heights. The spasms can be very severe and can last for several hours, sometimes even overnight.     The patient denies any changes in vision; no double vision or any sudden onset loss of vision. She also denies any episodes of sudden onset limb weakness or loss of bowel or bladder control. She does, however, have a family history of multiple sclerosis in her paternal aunt and that aunt's daughter.       ROS: 10-point review of systems was negative except for as noted above.     Vital signs:                         Estimated body mass index is 30.04 kg/m  as calculated from the following:    Height as of 3/18/19: 1.626 m (5' 4\").    Weight as of 3/18/19: 79.4 kg (175 lb).      Examination:     -General: sitting comfortably on the chair. No acute distress.    -HEENT: No skin discolorations noted on exposed skin. Head is normocephalic, atraumatic.     -Pulm: No increased work of breathing on RA.     -Abdomen: Soft, non-tender, non-distended.     -Musculoskeletal: No abnormalities noted.    -Neurological:     --MS: Patient is alert, attentive, and oriented. Speech is clear and fluent. Names normally. Remote memory intact. She recalls 2/3 objects after 5 minutes and is able to recall the third with a verbal clue.     --CNs: Pupils symmetric, round and reactive to light. Visual fields are full. Ocular motility is full without nystagmus. Facial sensation intact. Muscles of mastication and facial expression normal. Hearing intact to conversation. Palate elevation normal and uvula midline. Sternomastoid and trapezius function normal. Tongue motions normal.     --Motor: Normal muscle tone and bulk.  strength is strong bilaterally. Strength is 5/5 in bilateral upper extremities at the " biceps and triceps. In her lower extremities, she has 5/5 strength with hip flexion and extension b/l, 5/5 strength with knee flexion and extension b/l. Dorsiflexion and plantarflexion 4/5 on R, 3/5 on the L.     --Reflexes: Hyperreflexic biceps and brachioradialis on the R compared to the L but negative Arce. Reflexes at the patellae are very brisk with component of increased participation. Toes are mute.     --Sensory: There is reduced sensation to pinprick and vibration in the LLE as compared to the right. Proprioception in b/l LE preserved. Normal and symmetric sensation to PP and vibration in b/l upper extremities.     --Coordination: Heel-shin and finger-nose-finger is intact. Positive Romberg.     --Gait: Stands with feet normally spaced. Gait is somewhat slowed and cautious.      INVESTIGATIONS:  All available and relevant labs, imaging, and other procedures were reviewed with the patient at this visit.       IMPRESSION/RECOMMENDATIONS:   Ms. Kan is a 45-year-old female with a complex history most significant from a neurological standpoint for spinal stenosis and L5-S1 spondylolisthesis s/p bilateral decompression foraminotomy and L5-S1 interbody fusion (2015), as well as depression and anxiety. She presents today with the goal of ruling out any serious neurological etiologies for her symptoms of brain fog, weakness, muscle spasms and paresthesias. Namely, she is most interested in ruling out stroke and multiple sclerosis as potential causes for her symptoms. As we discussed, the history she describes today and the constellation of symptoms she has been dealing with are very uncharacteristic of any stroke syndrome I am aware of, and my suspicion for a vascular etiology is very low. Her presentation would also be very atypical for multiple sclerosis given the duration of her symptoms, the presence of triggers such as activity and stress, and the absence of affected vision. However, given that she has  "been dealing with these symptoms for many years now and does have abnormal exam findings (namely, hyperreflexia in the RUE), I do think it is reasonable to pursue imaging at this time to rule out any central pathology. To this end, we will get MRIs of her brain and C-spine with and without contrast, which will help us rule out several potential etiologies, including demyelinating disorders such as MS. In addition, I would like to pursue a neuropathy work-up at this time; we will check a vitamin B12, SPEP, TSH, MMA and folate. Pt will be contacted with her results via [place] if normal/negative and we will call her with any abnormal/positive findings. We will also plan for her to follow-up with a virtual visit in 6 weeks to discuss her results at greater length. In the meantime, she will call with any questions or concerns.      Patient has been seen with Dr. Brown who agrees with my assessment and plan.    Benjamin Romo MD  [facility] Department of Neurology PGY1  Pager: [phone]    Attending attestation: I saw and evaluated the patient on 11/18/20 with resident Dr. Romo. Please see his note dated 11/18/2020 for further details.     Mrs. Kan presents with her  today concerned about 15 years of feeling generally unwell. Specifically, she describes \"brain fog,\" global weakness and migrating paresthesias that all started in the context of worsening depression after a difficult pregnancy in 2004. She denies any overt relapses or acute onset of neurologic symptoms and particularly has never had symptoms of optic neuritis, unexplained bowel/bladder symptoms, or focal weakness. However, she did have sciatic pain and focal L foot drop in 2014 that greatly improved with decompressive foraminectomy at L4-5. Instrumentation was removed this summer and she again had improvement of her weakness and pain in that leg. Brain frog is nonspecific but worsens with mood and is certainly more difficult " in high stimulus environments. 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 She also has numbness that intermittent and involves bilateral legs, exacerbated with standing and fatigue, and often followed by spasms that can last for hours and persist in sleep.     Neurologic exam is notable for absence of APD, full EOM, normal fundoscopic exam. Tone and strength are normal throughout with the exception of 4/5 L ankle dorsiflexion. She has asymmetrically brisk R biceps and BR compared to L without a Arce's. Bilateral patellars are brisk but there is no cross-adduction, ankles are 2/4, toes are down, no clonus. She has reduced pinprick over the left arm and leg. Remainder of sensory exam, cerebellar testing, Rhomberg is normal. Gait is cautious and slightly wide based.    Recent L spine MRI reviewed, and report from brain MRI 2012 reviewed - normal.     Overall, Mrs. Kan has had over a decade of fatigue, nonspecific cognitive impairment, paresthesias and sensation of weakness. My suspicion for a unifying neurologic diagnosis is low. However, she does have asymmetrically brisk RUE reflexes in the absence of pathologic reflexes, and reduced pinprick over the left side. For this reason, brain and C spine MRIs are warranted. I will also send neuropathy labs. If these tests are normal, we discussed that her symptoms may be secondary to her poor mood and depression, which she understands. We will discuss this again after the above work up is complete. I also offered to refer her to a psychiatrist within our system and made some recommendations in the community as well. All of their questions are answered.    Lyubov Brown DO   of Neurology      Again, thank you for allowing me to participate in the care of your patient.      Sincerely,    David Romo

## 2020-11-18 NOTE — PROGRESS NOTES
"Attending attestation: I saw and evaluated the patient on 11/18/20 with resident Dr. Romo. Please see his note dated 11/18/2020 for further details.     Mrs. Kan presents with her  today concerned about 15 years of feeling generally unwell. Specifically, she describes \"brain fog,\" global weakness and migrating paresthesias that all started in the context of worsening depression after a difficult pregnancy in 2004. She denies any overt relapses or acute onset of neurologic symptoms and particularly has never had symptoms of optic neuritis, unexplained bowel/bladder symptoms, or focal weakness. However, she did have sciatic pain and focal L foot drop in 2014 that greatly improved with decompressive foraminectomy at L4-5. Instrumentation was removed this summer and she again had improvement of her weakness and pain in that leg. Brain frog is nonspecific but worsens with mood and is certainly more difficult in high stimulus environments. 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 She also has numbness that intermittent and involves bilateral legs, exacerbated with standing and fatigue, and often followed by spasms that can last for hours and persist in sleep.     Neurologic exam is notable for absence of APD, full EOM, normal fundoscopic exam. Tone and strength are normal throughout with the exception of 4/5 L ankle dorsiflexion. She has asymmetrically brisk R biceps and BR compared to L without a Arce's. Bilateral patellars are brisk but there is no cross-adduction, ankles are 2/4, toes are down, no clonus. She has reduced pinprick over the left arm and leg. Remainder of sensory exam, cerebellar testing, Rhomberg is normal. Gait is cautious and slightly wide based.    Recent L spine MRI reviewed, and report from brain MRI 2012 reviewed - normal.     Overall, Mrs. Kan has had over a decade of fatigue, nonspecific cognitive impairment, paresthesias and sensation of " weakness. My suspicion for a unifying neurologic diagnosis is low. However, she does have asymmetrically brisk RUE reflexes in the absence of pathologic reflexes, and reduced pinprick over the left side. For this reason, brain and C spine MRIs are warranted. I will also send neuropathy labs. If these tests are normal, we discussed that her symptoms may be secondary to her poor mood and depression, which she understands. We will discuss this again after the above work up is complete. I also offered to refer her to a psychiatrist within our system and made some recommendations in the community as well. All of their questions are answered.    Lyubov Brown DO   of Neurology

## 2020-11-18 NOTE — PATIENT INSTRUCTIONS
- Labs drawn today (TSH, B12, folate, MMA)  - MR Brain and C-spine  - We will call with any positive/abnormal results; otherwise, we will message you notifying that your results were normal  - Plan for follow-up in 6 weeks (virtual if possible)

## 2020-11-18 NOTE — LETTER
Date:January 28, 2021      Patient was self referred, no letter generated. Do not send.        [facility] Health Information

## 2020-11-27 LAB — METHYLMALONATE SERPL-SCNC: 0.22 UMOL/L (ref 0–0.4)

## 2020-12-22 ENCOUNTER — HOSPITAL ENCOUNTER (OUTPATIENT)
Dept: MRI IMAGING | Facility: CLINIC | Age: 45
End: 2020-12-22
Attending: STUDENT IN AN ORGANIZED HEALTH CARE EDUCATION/TRAINING PROGRAM
Payer: COMMERCIAL

## 2020-12-22 DIAGNOSIS — R20.2 PARESTHESIAS: ICD-10-CM

## 2020-12-22 PROCEDURE — 72156 MRI NECK SPINE W/O & W/DYE: CPT

## 2020-12-22 PROCEDURE — A9585 GADOBUTROL INJECTION: HCPCS | Performed by: STUDENT IN AN ORGANIZED HEALTH CARE EDUCATION/TRAINING PROGRAM

## 2020-12-22 PROCEDURE — 70553 MRI BRAIN STEM W/O & W/DYE: CPT | Mod: 26 | Performed by: RADIOLOGY

## 2020-12-22 PROCEDURE — 70553 MRI BRAIN STEM W/O & W/DYE: CPT

## 2020-12-22 PROCEDURE — 255N000002 HC RX 255 OP 636: Performed by: STUDENT IN AN ORGANIZED HEALTH CARE EDUCATION/TRAINING PROGRAM

## 2020-12-22 PROCEDURE — 72156 MRI NECK SPINE W/O & W/DYE: CPT | Mod: 26 | Performed by: RADIOLOGY

## 2020-12-22 RX ORDER — GADOBUTROL 604.72 MG/ML
10 INJECTION INTRAVENOUS ONCE
Status: COMPLETED | OUTPATIENT
Start: 2020-12-22 | End: 2020-12-22

## 2020-12-22 RX ADMIN — GADOBUTROL 8 ML: 604.72 INJECTION INTRAVENOUS at 12:16

## 2021-01-15 ENCOUNTER — HEALTH MAINTENANCE LETTER (OUTPATIENT)
Age: 46
End: 2021-01-15

## 2021-01-20 ENCOUNTER — VIRTUAL VISIT (OUTPATIENT)
Dept: NEUROLOGY | Facility: CLINIC | Age: 46
End: 2021-01-20
Payer: COMMERCIAL

## 2021-01-20 DIAGNOSIS — R20.2 PARESTHESIAS: Primary | ICD-10-CM

## 2021-01-20 PROCEDURE — 99214 OFFICE O/P EST MOD 30 MIN: CPT | Mod: 95 | Performed by: STUDENT IN AN ORGANIZED HEALTH CARE EDUCATION/TRAINING PROGRAM

## 2021-01-20 NOTE — PROGRESS NOTES
Maira is a 45 year old who is being evaluated via a billable video visit.      How would you like to obtain your AVS? MyChart  If the video visit is dropped, the invitation should be resent by: Send to e-mail at: [email or web address]  Will anyone else be joining your video visit? No      Video Start Time: 1511  Video-Visit Details    Type of service:  Video Visit    Video End Time:1435    Originating Location (pt. Location): Home    Distant Location (provider location):  [facility] NEUROLOGY [facility]     Platform used for Video Visit: M[employer]en

## 2021-01-20 NOTE — LETTER
1/20/2021       RE: Maira Kan  [address] [id number]     Dear Colleague,    Thank you for referring your patient, Maira Kan, to the [facility] NEUROLOGY CLINIC [place] [facility]. Please see a copy of my visit note below.      [facility] NEUROLOGY/HEADACHE CLINIC TELEMEDICINE VISIT NOTE: The patient's condition can be safely assessed and treated via synchronous audio and visual telemedicine encounter.      Reason for Telemedicine Visit: This visit was conducted via TELEMEDICINE due to the current COVID-19 crisis to reduce patient risk.     Originating Site (Patient Location): Patient's home    Distant Site (Provider Location): [facility]: [facility]    Consent:  The patient/guardian has verbally consented to: the potential risks and benefits of telemedicine (video visit) versus in person care; bill my insurance or make self-payment for services provided; and responsibility for payment of non-covered services.     Mode of Communication:  Video Conference via Animated Speech    As the provider I attest to compliance with applicable laws and regulations related to telemedicine.    Consent has been obtained for this service by care team member: yes.  See the scanned image in the medical record.       Provider Note  Maira Kan is 45 year old female who I am seeing today in follow up for multiple neurologic complaints, namely generalized paresthesias and brain fog. She reports that since her last visit, she has no new symptoms.  Everything feels stable.  She continues to have diffuse paresthesias, but they seem improved due to the colder temperatures.  She received reports from her brain and cervical spine MRI would like to review them.  She wonders if there is any signs of joint damage in the spine, and if it should be discussed with her primary care doctor.    Past Medical History:   Diagnosis Date     Hypertension         Current Outpatient Medications   Medication Sig Dispense Refill     acetaminophen (TYLENOL) 325 MG tablet Take 325-650 mg by mouth       BUPROPION HCL PO Take 300 mg by mouth daily        Cholecalciferol (VITAMIN D) 50 MCG (2000 UT) CAPS Take by mouth daily       escitalopram (LEXAPRO) 20 MG tablet Take 20 mg by mouth       lisinopril-hydrochlorothiazide (ZESTORETIC) 20-25 MG tablet Take 1 tablet by mouth daily        LORazepam (ATIVAN) 1 MG tablet Take 1 tablet (1 mg) by mouth once as needed for anxiety (one hour prior to MRI) 1 tablet 0     Melatonin 10 MG TABS tablet Take 10-20 mg by mouth       zolpidem (AMBIEN) 5 MG tablet Take 5-10 mg by mouth nightly as needed for sleep         Social History     Tobacco Use     Smoking status: Former Smoker     Smokeless tobacco: Never Used   Substance Use Topics     Alcohol use: Yes     Comment: social drugs         Review of Systems:   A 10-point ROS including constitutional, eyes, respiratory, cardiovascular, gastroenterology, genitourinary, integumentary, musculoskeletal, neurology and psychiatric were all reviewed and  as mentioned in the HPI.     General Exam:   Limited. Maira Kan is awake, alert and oriented to time, place and person.  She has normal speech output without evidence of aphasia. Speech is fluent. Fund of knowledge is excellent. Face symmetric, nondysarthric, full EOM. No abnormal movements.    Data Reviewed:  I personally reviewed her brain and C spine MRIs from December 2020. The brain MRI is normal. On C spine MRI, there is a bulging disc at C6-7 that abuts the ventral cord but does not cause any signal change or cord compression.  B12, TSH, folate all WNL     Assessment and Plan:  Maira Kan is 45 year old female who I am seeing today in follow up for multiple neurologic complaints, namely generalized paresthesias and brain fog for over a decade. Since our last visit, symptoms are slightly improved with the colder temperatures and she has  no new concerning features. We reviewed her brain and C spine MRIs and I was able to show her that the brain scan was normal. We also reviewed the bulging disc at C6-7 that otherwise does not impact the cord. Neuropathy labs are reassuring. At this time I do not feel that there is neurologic diagnosis for her symptoms and we discussed that it would be helpful to discuss her mood symptoms with a therapist or counselor to try to optimize the mind-body connection. She will also consider duloxetine, which can be helpful for these symptoms, but wants to discuss with her psychiatrist. Regarding her joint pain, I suggested she discuss with her PCP who can order labs, if necessary and consider rheumatologic referral. Otherwise, from our standpoint, she will follow up as needed.     Pertinent parts of the the patient's medical history reviewed and confirmed by the provider included : recent diagnostic tests, social history, medications      I have reviewed the note as documented above.  This accurately captures the substance of my conversation with the patient    Total time of call between patient and provider was 36 minutes     Review of the result(s) of each unique test - brain and C spine MRIs, TSH, folate, MMA, B12 labs  45 min spent on the date of the encounter in chart review, patient visit, review of tests, documentation and/or discussion with other providers about the issues documented above.             Lyubov Brown,   Department of Neurology       Maira is a 45 year old who is being evaluated via a billable video visit.      How would you like to obtain your AVS? MyChart  If the video visit is dropped, the invitation should be resent by: Send to e-mail at: [email or web address]  Will anyone else be joining your video visit? No      Video Start Time: 1511  Video-Visit Details    Type of service:  Video Visit    Video End Time:1435    Originating Location (pt. Location): Home    Distant Location (provider  location):  [facility] NEUROLOGY [facility]     Platform used for Video Visit: AmWell        Again, thank you for allowing me to participate in the care of your patient.      Sincerely,    David Romo

## 2021-01-20 NOTE — PATIENT INSTRUCTIONS
1. Discuss duloxetine with your psychiatrist.  2. Consider starting psychotherapy or working with a counselor.  3. Discuss joint concerns with your PCP, Dr. Lunsford.  4. Call or MyChart with any future concerns.   5. Follow up with neurology as needed.

## 2021-01-20 NOTE — PROGRESS NOTES
[facility] NEUROLOGY/HEADACHE CLINIC TELEMEDICINE VISIT NOTE: The patient's condition can be safely assessed and treated via synchronous audio and visual telemedicine encounter.      Reason for Telemedicine Visit: This visit was conducted via TELEMEDICINE due to the current COVID-19 crisis to reduce patient risk.     Originating Site (Patient Location): Patient's home    Distant Site (Provider Location): [facility]: [facility]    Consent:  The patient/guardian has verbally consented to: the potential risks and benefits of telemedicine (video visit) versus in person care; bill my insurance or make self-payment for services provided; and responsibility for payment of non-covered services.     Mode of Communication:  Video Conference via [employer]    As the provider I attest to compliance with applicable laws and regulations related to telemedicine.    Consent has been obtained for this service by care team member: yes.  See the scanned image in the medical record.       Provider Note  Maira Kan is 45 year old female who I am seeing today in follow up for multiple neurologic complaints, namely generalized paresthesias and brain fog. She reports that since her last visit, she has no new symptoms.  Everything feels stable.  She continues to have diffuse paresthesias, but they seem improved due to the colder temperatures.  She received reports from her brain and cervical spine MRI would like to review them.  She wonders if there is any signs of joint damage in the spine, and if it should be discussed with her primary care doctor.    Past Medical History:   Diagnosis Date     Hypertension        Current Outpatient Medications   Medication Sig Dispense Refill     acetaminophen (TYLENOL) 325 MG tablet Take 325-650 mg by mouth       BUPROPION HCL PO Take 300 mg by mouth daily        Cholecalciferol (VITAMIN D) 50 MCG (2000 UT) CAPS Take by mouth daily       escitalopram (LEXAPRO) 20 MG tablet Take 20 mg by  mouth       lisinopril-hydrochlorothiazide (ZESTORETIC) 20-25 MG tablet Take 1 tablet by mouth daily        LORazepam (ATIVAN) 1 MG tablet Take 1 tablet (1 mg) by mouth once as needed for anxiety (one hour prior to MRI) 1 tablet 0     Melatonin 10 MG TABS tablet Take 10-20 mg by mouth       zolpidem (AMBIEN) 5 MG tablet Take 5-10 mg by mouth nightly as needed for sleep         Social History     Tobacco Use     Smoking status: Former Smoker     Smokeless tobacco: Never Used   Substance Use Topics     Alcohol use: Yes     Comment: social drugs         Review of Systems:   A 10-point ROS including constitutional, eyes, respiratory, cardiovascular, gastroenterology, genitourinary, integumentary, musculoskeletal, neurology and psychiatric were all reviewed and  as mentioned in the HPI.     General Exam:   Limited. Maira Kan is awake, alert and oriented to time, place and person.  She has normal speech output without evidence of aphasia. Speech is fluent. Fund of knowledge is excellent. Face symmetric, nondysarthric, full EOM. No abnormal movements.    Data Reviewed:  I personally reviewed her brain and C spine MRIs from December 2020. The brain MRI is normal. On C spine MRI, there is a bulging disc at C6-7 that abuts the ventral cord but does not cause any signal change or cord compression.  B12, TSH, folate all WNL     Assessment and Plan:  Maira Kan is 45 year old female who I am seeing today in follow up for multiple neurologic complaints, namely generalized paresthesias and brain fog for over a decade. Since our last visit, symptoms are slightly improved with the colder temperatures and she has no new concerning features. We reviewed her brain and C spine MRIs and I was able to show her that the brain scan was normal. We also reviewed the bulging disc at C6-7 that otherwise does not impact the cord. Neuropathy labs are reassuring. At this time I do not feel that there is neurologic diagnosis for her symptoms  and we discussed that it would be helpful to discuss her mood symptoms with a therapist or counselor to try to optimize the mind-body connection. She will also consider duloxetine, which can be helpful for these symptoms, but wants to discuss with her psychiatrist. Regarding her joint pain, I suggested she discuss with her PCP who can order labs, if necessary and consider rheumatologic referral. Otherwise, from our standpoint, she will follow up as needed.     Pertinent parts of the the patient's medical history reviewed and confirmed by the provider included : recent diagnostic tests, social history, medications      I have reviewed the note as documented above.  This accurately captures the substance of my conversation with the patient    Total time of call between patient and provider was 36 minutes     Review of the result(s) of each unique test - brain and C spine MRIs, TSH, folate, MMA, B12 labs  45 min spent on the date of the encounter in chart review, patient visit, review of tests, documentation and/or discussion with other providers about the issues documented above.             Lyubov Brown, DO  Department of Neurology

## 2021-01-20 NOTE — LETTER
Date:March 21, 2021      Patient was self referred, no letter generated. Do not send.        [facility] Health Information

## 2021-02-07 ENCOUNTER — HEALTH MAINTENANCE LETTER (OUTPATIENT)
Age: 46
End: 2021-02-07

## 2021-04-28 ENCOUNTER — TRANSFERRED RECORDS (OUTPATIENT)
Dept: HEALTH INFORMATION MANAGEMENT | Facility: CLINIC | Age: 46
End: 2021-04-28
Payer: COMMERCIAL

## 2021-05-29 ENCOUNTER — RECORDS - HEALTHEAST (OUTPATIENT)
Dept: ADMINISTRATIVE | Facility: CLINIC | Age: 46
End: 2021-05-29

## 2021-05-30 ENCOUNTER — RECORDS - HEALTHEAST (OUTPATIENT)
Dept: ADMINISTRATIVE | Facility: CLINIC | Age: 46
End: 2021-05-30

## 2021-05-31 ENCOUNTER — RECORDS - HEALTHEAST (OUTPATIENT)
Dept: ADMINISTRATIVE | Facility: CLINIC | Age: 46
End: 2021-05-31

## 2021-09-12 ENCOUNTER — HEALTH MAINTENANCE LETTER (OUTPATIENT)
Age: 46
End: 2021-09-12

## 2021-09-13 ENCOUNTER — APPOINTMENT (OUTPATIENT)
Dept: URBAN - METROPOLITAN AREA CLINIC 260 | Age: 46
Setting detail: DERMATOLOGY
End: 2021-09-14

## 2021-09-13 VITALS — WEIGHT: 175 LBS | HEIGHT: 64 IN

## 2021-09-13 VITALS — RESPIRATION RATE: 15 BRPM | HEIGHT: 64 IN | WEIGHT: 175 LBS

## 2021-09-13 DIAGNOSIS — L40.0 PSORIASIS VULGARIS: ICD-10-CM

## 2021-09-13 DIAGNOSIS — L40.59 OTHER PSORIATIC ARTHROPATHY: ICD-10-CM

## 2021-09-13 PROBLEM — L30.9 DERMATITIS, UNSPECIFIED: Status: ACTIVE | Noted: 2021-09-13

## 2021-09-13 PROCEDURE — OTHER ORDER TESTS: OTHER

## 2021-09-13 PROCEDURE — OTHER COUNSELING: OTHER

## 2021-09-13 PROCEDURE — OTHER VENIPUNCTURE: OTHER

## 2021-09-13 PROCEDURE — 11104 PUNCH BX SKIN SINGLE LESION: CPT

## 2021-09-13 PROCEDURE — 99204 OFFICE O/P NEW MOD 45 MIN: CPT | Mod: 25

## 2021-09-13 PROCEDURE — OTHER BIOPSY BY PUNCH METHOD: OTHER

## 2021-09-13 PROCEDURE — 36415 COLL VENOUS BLD VENIPUNCTURE: CPT

## 2021-09-13 ASSESSMENT — LOCATION ZONE DERM
LOCATION ZONE: ARM
LOCATION ZONE: KNEE
LOCATION ZONE: LEG
LOCATION ZONE: TRUNK

## 2021-09-13 ASSESSMENT — LOCATION SIMPLE DESCRIPTION DERM
LOCATION SIMPLE: GENITALIA
LOCATION SIMPLE: RIGHT KNEE
LOCATION SIMPLE: LEFT THIGH
LOCATION SIMPLE: LEFT PATELLOFEMORAL
LOCATION SIMPLE: LEFT FOREARM

## 2021-09-13 ASSESSMENT — LOCATION DETAILED DESCRIPTION DERM
LOCATION DETAILED: LEFT VENTRAL PROXIMAL FOREARM
LOCATION DETAILED: LEFT PATELLOFEMORAL JOINT
LOCATION DETAILED: RIGHT KNEE JOINT
LOCATION DETAILED: LEFT ANTERIOR PROXIMAL THIGH
LOCATION DETAILED: GENITALIA

## 2021-09-13 NOTE — HPI: RASH (ECZEMA)
How Severe Is Your Eczema?: moderate
Is This A New Presentation, Or A Follow-Up?: Rash
Additional History: Patient had blood work for psoriasis and everything came back negative.

## 2021-09-13 NOTE — PROCEDURE: COUNSELING
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 She notes that another doctor looked with a Woods lamp to rule out erythrasma and there was no fluorescence. The pattern is a little unusual, particularly with some scattered guttate lesions. She did show me a pathology report from a biopsy of her abdomen which showed psoriasis. Before I start her on a biologic I would just like to confirm that this is psoriasis given that the morphology is a little bit atypical. I am further concerned that her joint pain is psoriatic arthritis. [facility] and I noted a recent battery of rheumatologic tests, all of which were negative, including ESR, CRP, ALIN, RF, CCP, etc. X-rays of the spine showed spondylolisthesis but are not noted to show evidence of ankylosing spondlyitis. On exam she does have some synovitis of the wrists and multiple MCP and PIP joints, but no obvious effusions and question of synovitis. Recent x-rays of the hands note a lack of erosions.\\n\\nInteresting is the report from the patient that she has a history of a hip fracture, but no known history of osteoporosis, although she has not had a DEXA scan. She is known to have low vitamin D. She also has an unexplained elevation in her renin level and hypertension, without evidence of renal artery stenosis. She has a complex medical presentation and I would like to see more detailed medical records from [facility], and Damaris will drop them off sometime this week. I will review them next week to discuss with her at our follow-up in two weeks. \\n\\nFor now we will plan to start Humira if her biopsy does demonstrate psoriasis. We will do the necessary initiation labs today. I think she may benefit significantly from this. Of note, she has tried and failed topical steroids many times. A TNF is indicated for her psoriatic arthritis. 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 She notes that another doctor looked with a Woods lamp to rule out erythrasma and there was no fluorescence. The pattern is a little unusual, particularly with some scattered guttate lesions. She did show me a pathology report from a biopsy of her abdomen which showed psoriasis. Before I start her on a biologic I would just like to confirm that this is psoriasis given that the morphology is a little bit atypical. I am further concerned that her joint pain is psoriatic arthritis. We [facility] and I noted a recent battery of rheumatologic tests, all of which were negative, including ESR, CRP, ALIN, RF, CCP, etc. X-rays of the spine showed spondylolisthesis but are not noted to show evidence of ankylosing spondlyitis. On exam she does have some synovitis of the wrists and multiple MCP and PIP joints, but no obvious effusions and question of synovitis. Recent x-rays of the hands note a lack of erosions.\\n\\nInteresting is the report from the patient that she has a history of a hip fracture, but no known history of osteoporosis, although she has not had a DEXA scan. She is known to have low vitamin D. She also has an unexplained elevation in her renin level and hypertension, without evidence of renal artery stenosis. She has a complex medical presentation and I would like to see more detailed medical records from [facility], and Damaris will drop them off sometime this week. I will review them next week to discuss with her at our follow-up in two weeks. \\n\\nFor now we will plan to start Humira if her biopsy does demonstrate psoriasis. We will do the necessary initiation labs today. I think she may benefit significantly from this. Of note, she has tried and failed topical steroids many times. A TNF is indicated for her psoriatic arthritis.

## 2021-09-13 NOTE — PROCEDURE: BIOPSY BY PUNCH METHOD
Billing Type: Client Bill
Validate Note Data (See Information Below): Yes
Validate Triangulation: No
Hemostasis: None
Post-Care Instructions: I reviewed with the patient in detail post-care instructions. Patient is to keep the biopsy site dry overnight, and then apply bacitracin twice daily until healed. Patient may apply hydrogen peroxide soaks to remove any crusting.
Epidermal Sutures: 4-0 Ethilon
Anesthesia Volume In Cc (Will Not Render If 0): 0.5
Dressing: bandage
Size Of Lesion In Cm (Optional): 0
Biopsy Type: H and E
Consent: Written consent was obtained and risks were reviewed including but not limited to scarring, infection, bleeding, scabbing, incomplete removal, nerve damage and allergy to anesthesia.
Notification Instructions: Patient will be notified of biopsy results. However, patient instructed to call the office if not contacted within 2 weeks.
Punch Size In Mm: 4
Information: Selecting Yes will display possible errors in your note based on the variables you have selected. This validation is only offered as a suggestion for you. PLEASE NOTE THAT THE VALIDATION TEXT WILL BE REMOVED WHEN YOU FINALIZE YOUR NOTE. IF YOU WANT TO FAX A PRELIMINARY NOTE YOU WILL NEED TO TOGGLE THIS TO 'NO' IF YOU DO NOT WANT IT IN YOUR FAXED NOTE.
Detail Level: Detailed
Wound Care: Petrolatum
Anesthesia Type: 1% lidocaine with epinephrine
Suture Removal: 14 days
Home Suture Removal Text: Patient was provided a home suture removal kit and will remove their sutures at home.  If they have any questions or difficulties they will call the office.

## 2021-09-16 ENCOUNTER — APPOINTMENT (OUTPATIENT)
Dept: URBAN - METROPOLITAN AREA CLINIC 255 | Age: 46
Setting detail: DERMATOLOGY
End: 2021-09-16

## 2021-09-16 DIAGNOSIS — B36.0 PITYRIASIS VERSICOLOR: ICD-10-CM

## 2021-09-16 PROCEDURE — OTHER COUNSELING: OTHER

## 2021-09-16 PROCEDURE — OTHER PRESCRIPTION: OTHER

## 2021-09-16 RX ORDER — MICONAZOLE NITRATE 1 %
KIT TOPICAL BID
Qty: 30 | Refills: 0 | Status: ERX | COMMUNITY
Start: 2021-09-16

## 2021-09-16 RX ORDER — ITRACONAZOLE 100 MG/1
200 MG CAPSULE ORAL QD
Qty: 14 | Refills: 0 | Status: ERX | COMMUNITY
Start: 2021-09-16

## 2021-09-16 ASSESSMENT — LOCATION ZONE DERM
LOCATION ZONE: TRUNK
LOCATION ZONE: LEG

## 2021-09-16 ASSESSMENT — LOCATION DETAILED DESCRIPTION DERM
LOCATION DETAILED: LEFT ANTERIOR PROXIMAL THIGH
LOCATION DETAILED: GENITALIA
LOCATION DETAILED: RIGHT ANTERIOR PROXIMAL THIGH

## 2021-09-16 ASSESSMENT — LOCATION SIMPLE DESCRIPTION DERM
LOCATION SIMPLE: LEFT THIGH
LOCATION SIMPLE: RIGHT THIGH
LOCATION SIMPLE: GENITALIA

## 2021-10-11 ENCOUNTER — APPOINTMENT (OUTPATIENT)
Dept: URBAN - METROPOLITAN AREA CLINIC 260 | Age: 46
Setting detail: DERMATOLOGY
End: 2021-10-18

## 2021-10-11 DIAGNOSIS — B36.0 PITYRIASIS VERSICOLOR: ICD-10-CM

## 2021-10-11 DIAGNOSIS — L40.0 PSORIASIS VULGARIS: ICD-10-CM

## 2021-10-11 DIAGNOSIS — L40.59 OTHER PSORIATIC ARTHROPATHY: ICD-10-CM

## 2021-10-11 PROCEDURE — OTHER COUNSELING: OTHER

## 2021-10-11 PROCEDURE — OTHER SUTURE REMOVAL (GLOBAL PERIOD): OTHER

## 2021-10-11 PROCEDURE — OTHER PRESCRIPTION: OTHER

## 2021-10-11 PROCEDURE — 99214 OFFICE O/P EST MOD 30 MIN: CPT

## 2021-10-11 PROCEDURE — OTHER PRESCRIPTION MEDICATION MANAGEMENT: OTHER

## 2021-10-11 PROCEDURE — OTHER ADDITIONAL NOTES: OTHER

## 2021-10-11 RX ORDER — MICONAZOLE NITRATE 1 %
KIT TOPICAL
Qty: 60 | Refills: 1 | Status: ERX

## 2021-10-11 RX ORDER — TACROLIMUS 1 MG/G
OINTMENT TOPICAL
Qty: 60 | Refills: 2 | Status: ERX | COMMUNITY
Start: 2021-10-11

## 2021-10-11 RX ORDER — TRIAMCINOLONE ACETONIDE 1 MG/G
CREAM TOPICAL
Qty: 80 | Refills: 2 | Status: ERX | COMMUNITY
Start: 2021-10-11

## 2021-10-11 ASSESSMENT — LOCATION ZONE DERM
LOCATION ZONE: TRUNK
LOCATION ZONE: LEG
LOCATION ZONE: KNEE

## 2021-10-11 ASSESSMENT — LOCATION SIMPLE DESCRIPTION DERM
LOCATION SIMPLE: RIGHT THIGH
LOCATION SIMPLE: LEFT THIGH
LOCATION SIMPLE: LEFT PATELLOFEMORAL
LOCATION SIMPLE: GENITALIA
LOCATION SIMPLE: RIGHT KNEE

## 2021-10-11 ASSESSMENT — LOCATION DETAILED DESCRIPTION DERM
LOCATION DETAILED: RIGHT ANTERIOR PROXIMAL THIGH
LOCATION DETAILED: LEFT PATELLOFEMORAL JOINT
LOCATION DETAILED: RIGHT KNEE JOINT
LOCATION DETAILED: GENITALIA
LOCATION DETAILED: LEFT ANTERIOR PROXIMAL THIGH

## 2021-10-11 NOTE — PROCEDURE: SUTURE REMOVAL (GLOBAL PERIOD)
Add 38873 Cpt? (Important Note: In 2017 The Use Of 61698 Is Being Tracked By Cms To Determine Future Global Period Reimbursement For Global Periods): no
Detail Level: Detailed

## 2021-10-11 NOTE — PROCEDURE: PRESCRIPTION MEDICATION MANAGEMENT
Initiate Treatment: Triamcinolone (body) + Tacrolimus (face)
Detail Level: Zone
Modify Regimen: Avoid applying topical steroid to groin area
Continue Regimen: Terbinafine 1% cream
Plan: Return in 2 weeks for recheck
Render In Strict Bullet Format?: No
Discontinue Regimen: Itraconazole

## 2021-10-11 NOTE — PROCEDURE: ADDITIONAL NOTES
Detail Level: Simple
Render Risk Assessment In Note?: no
Additional Notes: Recommend holding off on Humira injections at this time as we do not want to compromise her immune system when she is ill.

## 2021-10-12 RX ORDER — TACROLIMUS 1 MG/G
OINTMENT TOPICAL
Qty: 30 | Refills: 2 | Status: ERX

## 2021-10-25 ENCOUNTER — APPOINTMENT (OUTPATIENT)
Dept: URBAN - METROPOLITAN AREA CLINIC 260 | Age: 46
Setting detail: DERMATOLOGY
End: 2021-10-27

## 2021-10-25 VITALS — HEIGHT: 64 IN | RESPIRATION RATE: 16 BRPM | WEIGHT: 175 LBS

## 2021-10-25 DIAGNOSIS — L40.59 OTHER PSORIATIC ARTHROPATHY: ICD-10-CM

## 2021-10-25 DIAGNOSIS — L40.0 PSORIASIS VULGARIS: ICD-10-CM

## 2021-10-25 DIAGNOSIS — B36.0 PITYRIASIS VERSICOLOR: ICD-10-CM

## 2021-10-25 PROCEDURE — 99214 OFFICE O/P EST MOD 30 MIN: CPT

## 2021-10-25 PROCEDURE — OTHER ADDITIONAL NOTES: OTHER

## 2021-10-25 PROCEDURE — OTHER COUNSELING: OTHER

## 2021-10-25 PROCEDURE — OTHER PRESCRIPTION MEDICATION MANAGEMENT: OTHER

## 2021-10-25 ASSESSMENT — LOCATION ZONE DERM
LOCATION ZONE: KNEE
LOCATION ZONE: TRUNK
LOCATION ZONE: LEG

## 2021-10-25 ASSESSMENT — LOCATION SIMPLE DESCRIPTION DERM
LOCATION SIMPLE: LEFT THIGH
LOCATION SIMPLE: RIGHT THIGH
LOCATION SIMPLE: GENITALIA
LOCATION SIMPLE: LEFT PATELLOFEMORAL
LOCATION SIMPLE: RIGHT KNEE

## 2021-10-25 ASSESSMENT — LOCATION DETAILED DESCRIPTION DERM
LOCATION DETAILED: LEFT ANTERIOR PROXIMAL THIGH
LOCATION DETAILED: LEFT PATELLOFEMORAL JOINT
LOCATION DETAILED: GENITALIA
LOCATION DETAILED: RIGHT KNEE JOINT
LOCATION DETAILED: RIGHT ANTERIOR PROXIMAL THIGH

## 2021-10-25 NOTE — PROCEDURE: PRESCRIPTION MEDICATION MANAGEMENT
Continue Regimen: Triamcinolone (body) + Tacrolimus (face) as needed for flares
Render In Strict Bullet Format?: No
Detail Level: Zone
Modify Regimen: Avoid applying topical steroid to groin area
Continue Regimen: Terbinafine 1% cream BID

## 2022-01-12 ENCOUNTER — TRANSCRIBE ORDERS (OUTPATIENT)
Dept: OTHER | Age: 47
End: 2022-01-12
Payer: COMMERCIAL

## 2022-01-12 DIAGNOSIS — G93.31 POSTVIRAL FATIGUE SYNDROME: ICD-10-CM

## 2022-01-12 DIAGNOSIS — R06.02 SOB (SHORTNESS OF BREATH): ICD-10-CM

## 2022-01-12 DIAGNOSIS — R05.3 CHRONIC COUGH: Primary | ICD-10-CM

## 2022-01-12 DIAGNOSIS — R00.2 PALPITATIONS: ICD-10-CM

## 2022-02-27 ENCOUNTER — HEALTH MAINTENANCE LETTER (OUTPATIENT)
Age: 47
End: 2022-02-27

## 2022-05-15 NOTE — TELEPHONE ENCOUNTER
5/16/2022-Request for ECHO, EKG strips and images faxed to [facility]MR @ 538am      RECORDS RECEIVED FROM:   DATE RECEIVED:   NOTES STATUS DETAILS   [facility]   OFFICE NOTE from other cardiologist        DISCHARGE SUMMARY from hospital        DISCHARGE REPORT from the ER       OPERATIVE REPORT        MEDICATION LIST       LABS     BMP   Internal 5/6/2020   CBC   Internal 5/4/2021, 1/10/2022   CMP   Internal 4/19/2021   Lipids   Internal and [facility]  1/7/2020    3/18/2020   TSH   Internal 6/10/2021   DIAGNOSTIC PROCEDURES     EKG   [facility] 4/28/2021-Requested    Monitor Reports       IMAGING (DISC & REPORT)      Echo   [facility] 5/4/2021-Requested    Stress Tests       Cath       MRI/MRA       CT/CTA   [facility] CT Chest-1/25/2022-Requested

## 2022-05-17 ENCOUNTER — PRE VISIT (OUTPATIENT)
Dept: CARDIOLOGY | Facility: CLINIC | Age: 47
End: 2022-05-17
Payer: COMMERCIAL

## 2022-05-17 ENCOUNTER — OFFICE VISIT (OUTPATIENT)
Dept: CARDIOLOGY | Facility: CLINIC | Age: 47
End: 2022-05-17
Attending: INTERNAL MEDICINE
Payer: COMMERCIAL

## 2022-05-17 VITALS
BODY MASS INDEX: 32.15 KG/M2 | DIASTOLIC BLOOD PRESSURE: 74 MMHG | OXYGEN SATURATION: 98 % | SYSTOLIC BLOOD PRESSURE: 113 MMHG | WEIGHT: 188.3 LBS | HEART RATE: 107 BPM | HEIGHT: 64 IN

## 2022-05-17 DIAGNOSIS — R06.02 SOB (SHORTNESS OF BREATH): ICD-10-CM

## 2022-05-17 DIAGNOSIS — R05.3 CHRONIC COUGH: ICD-10-CM

## 2022-05-17 DIAGNOSIS — G93.31 POSTVIRAL FATIGUE SYNDROME: ICD-10-CM

## 2022-05-17 DIAGNOSIS — G90.A POTS (POSTURAL ORTHOSTATIC TACHYCARDIA SYNDROME): Primary | ICD-10-CM

## 2022-05-17 DIAGNOSIS — R00.2 PALPITATIONS: ICD-10-CM

## 2022-05-17 PROCEDURE — 99204 OFFICE O/P NEW MOD 45 MIN: CPT | Performed by: INTERNAL MEDICINE

## 2022-05-17 PROCEDURE — 93005 ELECTROCARDIOGRAM TRACING: CPT

## 2022-05-17 PROCEDURE — G0463 HOSPITAL OUTPT CLINIC VISIT: HCPCS

## 2022-05-17 RX ORDER — TACROLIMUS 1 MG/G
OINTMENT TOPICAL
COMMUNITY
Start: 2021-10-13

## 2022-05-17 RX ORDER — SODIUM CHLORIDE 9 MG/ML
INJECTION, SOLUTION INTRAVENOUS CONTINUOUS
Status: CANCELLED | OUTPATIENT
Start: 2022-05-17

## 2022-05-17 RX ORDER — LISDEXAMFETAMINE DIMESYLATE 50 MG
CAPSULE ORAL PRN
COMMUNITY
Start: 2022-05-15

## 2022-05-17 RX ORDER — TRIAMCINOLONE ACETONIDE 1 MG/G
CREAM TOPICAL
COMMUNITY
Start: 2021-10-11

## 2022-05-17 RX ORDER — ERGOCALCIFEROL 1.25 MG/1
CAPSULE, LIQUID FILLED ORAL
COMMUNITY
Start: 2022-03-10

## 2022-05-17 RX ORDER — LIDOCAINE 40 MG/G
CREAM TOPICAL
Status: CANCELLED | OUTPATIENT
Start: 2022-05-17

## 2022-05-17 ASSESSMENT — PAIN SCALES - GENERAL
PAINLEVEL: NO PAIN (0)

## 2022-05-17 NOTE — PROGRESS NOTES
Chief complaint: Concern for POTS    HPI:   Maira Kan is a 46 year old female with history of COVID (3/2021), psoriasis, spinal stenosis and L5-S1 spondylolisthesis s/p bilateral decompression foraminotomy and L5-S1 interbody fusion (2015), HTN, depression and anxiety.    Has been having brain fogginess and fatigue since she had her 2nd child in 2004. Pregnancy was complicated with gestational diabetes. Symptoms have worsened since her COVID illness. 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 Pt tested positive for COVID (along with ) in 3/2020. Did have flu like symptoms in 12/2019 had flu like symptoms with son. Symptoms worsened after she had COVID. Her fatigue is intense and occurs even after just brushing her teeth. She is mostly at home and bedbound. Does go to the bathroom and go to eat by herself. Has hot flashes twice a day with sweating. Her shortness of breath started after COVID and feels that she will get short of breath even after walking even a few 100 feet. This is a whitley difference from her prior self since she was able to get a  in [employer] in 2014.   If she stands, she feels like energy is drained from her when she stands up. Also feels dizzy and occasionally has double vision.   Has noticed fast heart rate since her COVID illness. At rest it is 90s-100s, can go up to 130s while brushing teeth and 160s if she starts walking.      Last TTE in 5/2021 showed LVEF of 60%. Had positive JAYESH in 11/2021. ESR, CRP, TSH and RF normal in 6/2021.      Dermatologist wants to start Vaishnavi for psoriasis. Did see Rheumatology and was not diagnosed with any autoimmune illness.    Lisinopril-hydrochlorothiazide 20-25mg    PAST MEDICAL HISTORY:  Past Medical History:   Diagnosis Date     Hypertension        CURRENT MEDICATIONS:  Current Outpatient  Medications   Medication Sig Dispense Refill     acetaminophen (TYLENOL) 325 MG tablet Take 325-650 mg by mouth       BUPROPION HCL PO Take 300 mg by mouth daily        Cholecalciferol (VITAMIN D) 50 MCG (2000 UT) CAPS Take by mouth daily       escitalopram (LEXAPRO) 20 MG tablet Take 20 mg by mouth       lisinopril-hydrochlorothiazide (ZESTORETIC) 20-25 MG tablet Take 1 tablet by mouth daily        LORazepam (ATIVAN) 1 MG tablet Take 1 tablet (1 mg) by mouth once as needed for anxiety (one hour prior to MRI) 1 tablet 0     Melatonin 10 MG TABS tablet Take 10-20 mg by mouth       zolpidem (AMBIEN) 5 MG tablet Take 5-10 mg by mouth nightly as needed for sleep         ALLERGIES:     Allergies   Allergen Reactions     Prednisone Other (See Comments)     Other reaction(s): Shock and/or Unconsciousness  Severe reaction----no breathing diff.  'hypovolemic shock'  Hypovolemic shock         FAMILY HISTORY:  None pertinent    SOCIAL HISTORY:  Social History     Tobacco Use     Smoking status: Former Smoker     Smokeless tobacco: Never Used   Substance Use Topics     Alcohol use: Yes     Comment: social drugs       ROS:   A comprehensive 14 point review of systems is negative other than as mentioned in HPI.    Exam:  There were no vitals taken for this visit.  GENERAL APPEARANCE: alert and no distress  EYES: no icterus, no xanthelasmas  ENT: normal palate, mucosa moist, no central cyanosis  NECK: JVP not elevated  RESPIRATORY: lungs clear to auscultation - no rales, rhonchi or wheezes, no use of accessory muscles, no retractions, respirations are unlabored, normal respiratory rate  CARDIOVASCULAR: regular rhythm, normal S1 with physiologic split S2, no S3 or S4 and no murmur, click or rub.  GI: soft, non tender, bowel sounds normal,no abdominal bruits  EXTREMITIES: no edema, no bruits  NEURO: alert and oriented to person/place/time, normal speech, gait and affect  VASC: Radial, dorsalis pedis and posterior tibialis pulses 2+  bilaterally.  SKIN: no ecchymoses, no rashes.  PSYCH: cooperative, affect appropriate.     Labs:  Reviewed.     Outside results of note:  Outside records and relevant results/notes have been incorporated into HPI.    Assessment and Plan:   Maira Kan is a 46 year old female with history of COVID (3/2021), psoriasis, spinal stenosis and L5-S1 spondylolisthesis s/p bilateral decompression foraminotomy and L5-S1 interbody fusion (2015), HTN, depression and anxiety.     Deconditioning post COVID  Long COVID Syndrome  Likely Autonomic dysfunction  Pt likely has deconditioning from Long COVID along with some amount of autonomic dysfunction contributing as well. She has been bed bound for quite some time and hence its not surprising that even a miniscule amount of work can cause considerable fatigue, tachycardia and shortness of breath. We will first start with autonomic testing to ascertain exactly the extent of her symptoms that can be attributed to autonomic dysfunction and then address the deconditioning portion accordingly.   -Autonomic tilt table testing ordered    The patient states understanding and is agreeable with plan.     Plan of care discussed with Dr Jiménez. Attending attestation to follow.    Karlos Barron MD  Cardiology Fellow    Total elapsed time with chart review , visit and documentation 45 min    I very much appreciated the opportunity to see and assess Maira Kan in the clinic with CV Fellow Dr Barron. Please do not hesitate to contact my office if you have any questions or concerns.      Darren Jiménez MD  Cardiac Arrhythmia Service  [facility]  [phone]

## 2022-05-18 LAB
ATRIAL RATE - MUSE: 108 BPM
DIASTOLIC BLOOD PRESSURE - MUSE: NORMAL MMHG
INTERPRETATION ECG - MUSE: NORMAL
P AXIS - MUSE: 64 DEGREES
PR INTERVAL - MUSE: 138 MS
QRS DURATION - MUSE: 94 MS
QT - MUSE: 342 MS
QTC - MUSE: 458 MS
R AXIS - MUSE: 57 DEGREES
SYSTOLIC BLOOD PRESSURE - MUSE: NORMAL MMHG
T AXIS - MUSE: 14 DEGREES
VENTRICULAR RATE- MUSE: 108 BPM

## 2022-06-14 ENCOUNTER — TELEPHONE (OUTPATIENT)
Dept: LAB | Facility: CLINIC | Age: 47
End: 2022-06-14
Payer: COMMERCIAL

## 2022-06-15 DIAGNOSIS — Z01.812 ENCOUNTER FOR PREOPERATIVE SCREENING LABORATORY TESTING FOR COVID-19 VIRUS: Primary | ICD-10-CM

## 2022-06-15 DIAGNOSIS — Z11.52 ENCOUNTER FOR PREOPERATIVE SCREENING LABORATORY TESTING FOR COVID-19 VIRUS: Primary | ICD-10-CM

## 2022-06-28 ENCOUNTER — TELEPHONE (OUTPATIENT)
Dept: CARDIOLOGY | Facility: CLINIC | Age: 47
End: 2022-06-28

## 2022-06-28 ENCOUNTER — LAB (OUTPATIENT)
Dept: LAB | Facility: CLINIC | Age: 47
End: 2022-06-28
Attending: INTERNAL MEDICINE
Payer: COMMERCIAL

## 2022-06-28 DIAGNOSIS — Z01.812 ENCOUNTER FOR PREOPERATIVE SCREENING LABORATORY TESTING FOR COVID-19 VIRUS: ICD-10-CM

## 2022-06-28 DIAGNOSIS — Z11.52 ENCOUNTER FOR PREOPERATIVE SCREENING LABORATORY TESTING FOR COVID-19 VIRUS: ICD-10-CM

## 2022-06-28 LAB — SARS-COV-2 RNA RESP QL NAA+PROBE: NEGATIVE

## 2022-06-28 PROCEDURE — U0003 INFECTIOUS AGENT DETECTION BY NUCLEIC ACID (DNA OR RNA); SEVERE ACUTE RESPIRATORY SYNDROME CORONAVIRUS 2 (SARS-COV-2) (CORONAVIRUS DISEASE [COVID-19]), AMPLIFIED PROBE TECHNIQUE, MAKING USE OF HIGH THROUGHPUT TECHNOLOGIES AS DESCRIBED BY CMS-2020-01-R: HCPCS

## 2022-06-28 PROCEDURE — U0005 INFEC AGEN DETEC AMPLI PROBE: HCPCS

## 2022-06-29 ENCOUNTER — HOSPITAL ENCOUNTER (OUTPATIENT)
Facility: CLINIC | Age: 47
Discharge: HOME | End: 2022-06-29
Attending: INTERNAL MEDICINE | Admitting: INTERNAL MEDICINE
Payer: COMMERCIAL

## 2022-06-29 ENCOUNTER — APPOINTMENT (OUTPATIENT)
Dept: MEDSURG UNIT | Facility: CLINIC | Age: 47
End: 2022-06-29
Attending: INTERNAL MEDICINE
Payer: COMMERCIAL

## 2022-06-29 VITALS
OXYGEN SATURATION: 98 % | WEIGHT: 188.27 LBS | HEIGHT: 65 IN | BODY MASS INDEX: 31.37 KG/M2 | TEMPERATURE: 98.1 F | HEART RATE: 100 BPM | DIASTOLIC BLOOD PRESSURE: 87 MMHG | RESPIRATION RATE: 16 BRPM | SYSTOLIC BLOOD PRESSURE: 133 MMHG

## 2022-06-29 DIAGNOSIS — G93.31 POSTVIRAL FATIGUE SYNDROME: ICD-10-CM

## 2022-06-29 DIAGNOSIS — R00.2 PALPITATIONS: ICD-10-CM

## 2022-06-29 LAB — GLUCOSE BLDC GLUCOMTR-MCNC: 145 MG/DL (ref 70–99)

## 2022-06-29 PROCEDURE — 82962 GLUCOSE BLOOD TEST: CPT

## 2022-06-29 PROCEDURE — 93660 TILT TABLE EVALUATION: CPT | Performed by: INTERNAL MEDICINE

## 2022-06-29 PROCEDURE — 258N000003 HC RX IP 258 OP 636: Performed by: INTERNAL MEDICINE

## 2022-06-29 PROCEDURE — 272N000001 HC OR GENERAL SUPPLY STERILE: Performed by: INTERNAL MEDICINE

## 2022-06-29 PROCEDURE — 999N000132 HC STATISTIC PP CARE STAGE 1

## 2022-06-29 PROCEDURE — 95921 AUTONOMIC NRV PARASYM INERVJ: CPT | Mod: 26 | Performed by: INTERNAL MEDICINE

## 2022-06-29 PROCEDURE — 95921 AUTONOMIC NRV PARASYM INERVJ: CPT | Performed by: INTERNAL MEDICINE

## 2022-06-29 PROCEDURE — 999N000142 HC STATISTIC PROCEDURE PREP ONLY

## 2022-06-29 PROCEDURE — 93660 TILT TABLE EVALUATION: CPT | Mod: 26 | Performed by: INTERNAL MEDICINE

## 2022-06-29 RX ORDER — LIDOCAINE 40 MG/G
CREAM TOPICAL
Status: DISCONTINUED | OUTPATIENT
Start: 2022-06-29 | End: 2022-06-29 | Stop reason: HOSPADM

## 2022-06-29 RX ORDER — SODIUM CHLORIDE 9 MG/ML
INJECTION, SOLUTION INTRAVENOUS CONTINUOUS
Status: DISCONTINUED | OUTPATIENT
Start: 2022-06-29 | End: 2022-06-29 | Stop reason: HOSPADM

## 2022-06-29 RX ADMIN — SODIUM CHLORIDE 450 ML: 9 INJECTION, SOLUTION INTRAVENOUS at 08:48

## 2022-06-29 NOTE — PROGRESS NOTES
Arrived to Unit 2a for tilt table study in EP. AFVSS. Denies pain.  mg/dl. NS bolus infusing. Pt's  at bedside. Ready for consent. Stable.

## 2022-06-29 NOTE — H&P
H&P        Maira Kan is a 46 year old female with history of COVID (3/2021), psoriasis, spinal stenosis and L5-S1 spondylolisthesis s/p bilateral decompression foraminotomy and L5-S1 interbody fusion (2015), HTN, depression and anxiety.     Has been having brain fogginess and fatigue since she had her 2nd child in 2004. Pregnancy was complicated with gestational diabetes. Symptoms have worsened since her COVID illness. 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 Pt tested positive for COVID (along with ) in 3/2020. Did have flu like symptoms in 12/2019 had flu like symptoms with son. Symptoms worsened after she had COVID. Her fatigue is severe      PAST MEDICAL HISTORY:  Past Medical History        Past Medical History:   Diagnosis Date     Hypertension                SOCIAL HISTORY:  Social History            Tobacco Use     Smoking status: Former Smoker     Smokeless tobacco: Never Used   Substance Use Topics     Alcohol use: Yes       Comment: social drugs         ROS:   A comprehensive 14 point review of systems is negative other than as mentioned in HPI.     Exam:  There were no vitals taken for this visit.  GENERAL APPEARANCE: alert and no distress  EYES: no icterus, no xanthelasmas  ENT: normal palate, mucosa moist, no central cyanosis  NECK: JVP not elevated  RESPIRATORY: lungs clear to auscultation - no rales, rhonchi or wheezes, no use of accessory muscles, no retractions, respirations are unlabored, normal respiratory rate  CARDIOVASCULAR: regular rhythm, normal S1 with physiologic split S2, no S3 or S4 and no murmur, click or rub.  GI: soft, non tender, bowel sounds normal,no abdominal bruits  EXTREMITIES: no edema, no bruits  NEURO: alert and oriented to person/place/time, normal speech, gait and affect  VASC: Radial, dorsalis pedis and posterior tibialis  pulses 2+ bilaterally.  SKIN: no ecchymoses, no rashes.  PSYCH: cooperative, affect appropriate.        Maira Kan is a 46 year old female with history of COVID (3/2021), psoriasis, spinal stenosis and L5-S1 spondylolisthesis s/p bilateral decompression foraminotomy and L5-S1 interbody fusion (2015), HTN, depression and anxiety. Pt likely has deconditioning from Long COVID along with some amount of autonomic dysfunction contributing as well.     Plan autonomic testing today     Darren Jiménez MD  Cardiac Arrhythmia Service  [facility]  [phone]

## 2022-06-29 NOTE — PROCEDURES
[facility]    Procedure: *EP Procedure and/or Ablation    Date/Time: 6/29/2022 11:54 AM  Performed by: Darren Jiménez MD  Authorized by: Darren Jiménez MD       PROCEDURE    Length of time physician/provider present for 1:1 monitoring during sedation: 0

## 2022-06-29 NOTE — DISCHARGE INSTRUCTIONS
[facility]  DISCHARGE INSTRUCTIONS FOR   TILT TABLE STUDY      Date:  06/29/22    Plan:  - Use compression shorts (athletic compression shorts).  - Increase hydration with goal to take in 64 oz of water/electrolyte fluids per day.     Recommend drinking 8-10 oz. Try to avoid high carbohydrate electrolyte drinks.  - Eat six small meals per day with focus on foods low in carbohydrates and low in gluten.  - Liberalize salt intake.  - Change positions carefully and slowly and maintain safe environment.      Cardiovascular Clinic:  [address] [place] [address], [id number]    Your Care Team:        EP Cardiology      Telephone Number         Dr. Darren Whitten                [phone]         Tara Colon, ELIANE Marquez RN              [phone]         For Scheduling Appointments or              Procedures:      Dolly Bennett               [phone]       As always, Thank you for trusting us with your health care needs!

## 2022-06-29 NOTE — PROGRESS NOTES
Returned from CCL to 2A post tilt table study.  Denies pain, states she just feels fatigued.  Provided with po food & fluids.   called re completion.  Awaiting Dr. Jiménez for discussion of results.

## 2022-11-19 ENCOUNTER — HEALTH MAINTENANCE LETTER (OUTPATIENT)
Age: 47
End: 2022-11-19

## 2023-04-09 ENCOUNTER — HEALTH MAINTENANCE LETTER (OUTPATIENT)
Age: 48
End: 2023-04-09

## 2024-05-24 ENCOUNTER — LAB REQUISITION (OUTPATIENT)
Dept: LAB | Facility: CLINIC | Age: 49
End: 2024-05-24
Payer: COMMERCIAL

## 2024-05-24 DIAGNOSIS — R19.09 OTHER INTRA-ABDOMINAL AND PELVIC SWELLING, MASS AND LUMP: ICD-10-CM

## 2024-05-24 PROCEDURE — 86304 IMMUNOASSAY TUMOR CA 125: CPT | Mod: ORL | Performed by: OBSTETRICS & GYNECOLOGY

## 2024-05-24 PROCEDURE — 82378 CARCINOEMBRYONIC ANTIGEN: CPT | Mod: ORL | Performed by: OBSTETRICS & GYNECOLOGY

## 2024-05-25 LAB
CANCER AG125 SERPL-ACNC: 9 U/ML
CEA SERPL-MCNC: 1.1 NG/ML

## 2024-06-16 ENCOUNTER — HEALTH MAINTENANCE LETTER (OUTPATIENT)
Age: 49
End: 2024-06-16

## 2025-04-19 ENCOUNTER — HEALTH MAINTENANCE LETTER (OUTPATIENT)
Age: 50
End: 2025-04-19

## 2025-06-21 ENCOUNTER — HEALTH MAINTENANCE LETTER (OUTPATIENT)
Age: 50
End: 2025-06-21

## (undated) DEVICE — DRSG TEGADERM 2 3/8X2 3/4" 1624W

## (undated) DEVICE — SPONGE RAY-TEC 4X8" 7318

## (undated) DEVICE — GLOVE PROTEXIS W/NEU-THERA 7.5  2D73TE75

## (undated) DEVICE — SU VICRYL 3-0 FS-2 36" J423-H

## (undated) DEVICE — SU PDS II 0 CT-2 27" Z334H

## (undated) DEVICE — CUFF FINGER CLEARSIGHT LG CSCL

## (undated) DEVICE — SU MONOCRYL 5-0 P-3 18" UND Y493G

## (undated) DEVICE — SPECIMEN CONTAINER 4OZ

## (undated) DEVICE — PACK MINOR SBA15MIFSE

## (undated) DEVICE — SU PROLENE 5-0 P-3 18" 8698G

## (undated) DEVICE — SYR BULB IRRIG DOVER 60 ML LATEX FREE 67000

## (undated) DEVICE — DECANTER TRANSFER DEVICE 2008S

## (undated) DEVICE — TUBING INFUSION INFILTRATION LIPOSUCTION 156" 24-6008

## (undated) DEVICE — STPL SKIN 35W 054887

## (undated) DEVICE — SYR 50ML LL W/O NDL 309653

## (undated) DEVICE — PREP CHLORAPREP 26ML TINTED ORANGE  260815

## (undated) DEVICE — GLOVE PROTEXIS BLUE W/NEU-THERA 7.0  2D73EB70

## (undated) DEVICE — SU MONOCRYL 4-0 P-3 18" UND Y494G

## (undated) DEVICE — CUFF FINGER CLEARSIGHT MED CSCM

## (undated) DEVICE — SUCTION CANISTER MEDIVAC LINER 1500ML W/LID 65651-515

## (undated) DEVICE — CATH TRAY FOLEY SURESTEP 16FR W/URINE MTR STATLK LF A303416A

## (undated) DEVICE — NDL 19GA 1.5"

## (undated) DEVICE — SPONGE LAP 18X18" 1515

## (undated) DEVICE — SU MONOCRYL 3-0 PS-2 27" Y427H

## (undated) DEVICE — SUCTION TIP YANKAUER W/O VENT K86

## (undated) DEVICE — SU PDS II 3-0 PS-2 18" Z497G

## (undated) DEVICE — DRAPE SPLIT EENT 76X124" 3X28" 9447

## (undated) DEVICE — GLOVE PROTEXIS W/NEU-THERA 6.5  2D73TE65

## (undated) DEVICE — NDL SPINAL 25GA 3.5" QUINCKE 405180

## (undated) DEVICE — SU PDS II 4-0 P-3 18" Z494G

## (undated) DEVICE — SUCTION TUBING 10' N1010

## (undated) DEVICE — ADH LIQUID MASTISOL TOPICAL VIAL 2-3ML 0523-48

## (undated) DEVICE — SU MONOCRYL 4-0 PS-2 18" UND Y496G

## (undated) DEVICE — SU PDS II 2-0 SH 27" Z317H

## (undated) DEVICE — ESU NDL COLORADO MICRO 3CM STR N103A

## (undated) DEVICE — SOL WATER IRRIG 1000ML BOTTLE 07139-09

## (undated) DEVICE — DRAPE SHEET HALF 40X60" 9358

## (undated) DEVICE — MARKER SKIN DOUBLE TIP W/FLEXI-RULER W/LABELS

## (undated) DEVICE — DRSG STERI STRIP 1/2X4" R1547

## (undated) RX ORDER — HYDROMORPHONE HYDROCHLORIDE 2 MG/ML
INJECTION, SOLUTION INTRAMUSCULAR; INTRAVENOUS; SUBCUTANEOUS
Status: DISPENSED
Start: 2019-03-20

## (undated) RX ORDER — SODIUM CHLORIDE 9 MG/ML
INJECTION, SOLUTION INTRAVENOUS
Status: DISPENSED
Start: 2022-06-29

## (undated) RX ORDER — FENTANYL CITRATE 50 UG/ML
INJECTION, SOLUTION INTRAMUSCULAR; INTRAVENOUS
Status: DISPENSED
Start: 2019-03-20

## (undated) RX ORDER — ONDANSETRON 2 MG/ML
INJECTION INTRAMUSCULAR; INTRAVENOUS
Status: DISPENSED
Start: 2022-06-29

## (undated) RX ORDER — CEFAZOLIN SODIUM 2 G/100ML
INJECTION, SOLUTION INTRAVENOUS
Status: DISPENSED
Start: 2019-03-20

## (undated) RX ORDER — ACETAMINOPHEN 325 MG/1
TABLET ORAL
Status: DISPENSED
Start: 2019-03-20

## (undated) RX ORDER — PROPOFOL 10 MG/ML
INJECTION, EMULSION INTRAVENOUS
Status: DISPENSED
Start: 2019-03-20

## (undated) RX ORDER — CEFAZOLIN SODIUM 1 G/3ML
INJECTION, POWDER, FOR SOLUTION INTRAMUSCULAR; INTRAVENOUS
Status: DISPENSED
Start: 2019-03-20

## (undated) RX ORDER — DEXAMETHASONE SODIUM PHOSPHATE 4 MG/ML
INJECTION, SOLUTION INTRA-ARTICULAR; INTRALESIONAL; INTRAMUSCULAR; INTRAVENOUS; SOFT TISSUE
Status: DISPENSED
Start: 2019-03-20

## (undated) RX ORDER — OXYCODONE HYDROCHLORIDE 5 MG/1
TABLET ORAL
Status: DISPENSED
Start: 2019-03-20

## (undated) RX ORDER — SCOLOPAMINE TRANSDERMAL SYSTEM 1 MG/1
PATCH, EXTENDED RELEASE TRANSDERMAL
Status: DISPENSED
Start: 2019-03-20

## (undated) RX ORDER — GABAPENTIN 300 MG/1
CAPSULE ORAL
Status: DISPENSED
Start: 2019-03-20

## (undated) RX ORDER — GLYCOPYRROLATE 0.2 MG/ML
INJECTION INTRAMUSCULAR; INTRAVENOUS
Status: DISPENSED
Start: 2019-03-20